# Patient Record
Sex: MALE | Race: BLACK OR AFRICAN AMERICAN | Employment: FULL TIME | URBAN - METROPOLITAN AREA
[De-identification: names, ages, dates, MRNs, and addresses within clinical notes are randomized per-mention and may not be internally consistent; named-entity substitution may affect disease eponyms.]

---

## 2017-01-14 ENCOUNTER — APPOINTMENT (OUTPATIENT)
Dept: CT IMAGING | Age: 47
DRG: 438 | End: 2017-01-14
Attending: NURSE PRACTITIONER
Payer: COMMERCIAL

## 2017-01-14 ENCOUNTER — HOSPITAL ENCOUNTER (INPATIENT)
Age: 47
LOS: 4 days | Discharge: HOME | DRG: 438 | End: 2017-01-19
Attending: EMERGENCY MEDICINE | Admitting: HOSPITALIST
Payer: COMMERCIAL

## 2017-01-14 DIAGNOSIS — R73.9 HYPERGLYCEMIA: ICD-10-CM

## 2017-01-14 DIAGNOSIS — R74.8 ELEVATED LIVER ENZYMES: ICD-10-CM

## 2017-01-14 DIAGNOSIS — K85.90 ACUTE PANCREATITIS, UNSPECIFIED COMPLICATION STATUS, UNSPECIFIED PANCREATITIS TYPE: Primary | ICD-10-CM

## 2017-01-14 DIAGNOSIS — R10.84 ABDOMINAL PAIN, GENERALIZED: ICD-10-CM

## 2017-01-14 DIAGNOSIS — R00.0 TACHYCARDIA: ICD-10-CM

## 2017-01-14 DIAGNOSIS — I95.9 HYPOTENSION, UNSPECIFIED HYPOTENSION TYPE: ICD-10-CM

## 2017-01-14 LAB
ALBUMIN SERPL BCP-MCNC: 4.4 G/DL (ref 3.4–5)
ALBUMIN/GLOB SERPL: 2.2 {RATIO} (ref 0.8–1.7)
ALP SERPL-CCNC: 40 U/L (ref 45–117)
ALT SERPL-CCNC: 163 U/L (ref 16–61)
ANION GAP BLD CALC-SCNC: 15 MMOL/L (ref 3–18)
APPEARANCE UR: CLEAR
AST SERPL W P-5'-P-CCNC: 287 U/L (ref 15–37)
BASOPHILS # BLD AUTO: 0 K/UL (ref 0–0.06)
BASOPHILS # BLD: 0 % (ref 0–2)
BILIRUB DIRECT SERPL-MCNC: 0.4 MG/DL (ref 0–0.2)
BILIRUB SERPL-MCNC: 0.8 MG/DL (ref 0.2–1)
BILIRUB UR QL: NEGATIVE
BUN SERPL-MCNC: 14 MG/DL (ref 7–18)
BUN/CREAT SERPL: 10 (ref 12–20)
CALCIUM SERPL-MCNC: 9 MG/DL (ref 8.5–10.1)
CHLORIDE SERPL-SCNC: 102 MMOL/L (ref 100–108)
CO2 SERPL-SCNC: 22 MMOL/L (ref 21–32)
COLOR UR: YELLOW
CREAT SERPL-MCNC: 1.47 MG/DL (ref 0.6–1.3)
DIFFERENTIAL METHOD BLD: ABNORMAL
EOSINOPHIL # BLD: 0 K/UL (ref 0–0.4)
EOSINOPHIL NFR BLD: 0 % (ref 0–5)
ERYTHROCYTE [DISTWIDTH] IN BLOOD BY AUTOMATED COUNT: 13.8 % (ref 11.6–14.5)
GLOBULIN SER CALC-MCNC: 2 G/DL (ref 2–4)
GLUCOSE SERPL-MCNC: 214 MG/DL (ref 74–99)
GLUCOSE UR STRIP.AUTO-MCNC: >1000 MG/DL
HCT VFR BLD AUTO: 35.2 % (ref 36–48)
HGB BLD-MCNC: 11.4 G/DL (ref 13–16)
HGB UR QL STRIP: NEGATIVE
KETONES UR QL STRIP.AUTO: NEGATIVE MG/DL
LEUKOCYTE ESTERASE UR QL STRIP.AUTO: NEGATIVE
LIPASE SERPL-CCNC: 1474 U/L (ref 73–393)
LYMPHOCYTES # BLD AUTO: 17 % (ref 21–52)
LYMPHOCYTES # BLD: 0.4 K/UL (ref 0.9–3.6)
MCH RBC QN AUTO: 29.3 PG (ref 24–34)
MCHC RBC AUTO-ENTMCNC: 32.4 G/DL (ref 31–37)
MCV RBC AUTO: 90.5 FL (ref 74–97)
MONOCYTES # BLD: 0 K/UL (ref 0.05–1.2)
MONOCYTES NFR BLD AUTO: 0 % (ref 3–10)
NEUTS SEG # BLD: 1.9 K/UL (ref 1.8–8)
NEUTS SEG NFR BLD AUTO: 83 % (ref 40–73)
NITRITE UR QL STRIP.AUTO: NEGATIVE
PH UR STRIP: 5 [PH] (ref 5–8)
PLATELET # BLD AUTO: 170 K/UL (ref 135–420)
PMV BLD AUTO: 10.1 FL (ref 9.2–11.8)
POTASSIUM SERPL-SCNC: 3.6 MMOL/L (ref 3.5–5.5)
PROT SERPL-MCNC: 6.4 G/DL (ref 6.4–8.2)
PROT UR STRIP-MCNC: NEGATIVE MG/DL
RBC # BLD AUTO: 3.89 M/UL (ref 4.7–5.5)
SODIUM SERPL-SCNC: 139 MMOL/L (ref 136–145)
SP GR UR REFRACTOMETRY: >1.03 (ref 1–1.03)
UROBILINOGEN UR QL STRIP.AUTO: 0.2 EU/DL (ref 0.2–1)
WBC # BLD AUTO: 2.2 K/UL (ref 4.6–13.2)

## 2017-01-14 PROCEDURE — 96372 THER/PROPH/DIAG INJ SC/IM: CPT

## 2017-01-14 PROCEDURE — 96361 HYDRATE IV INFUSION ADD-ON: CPT

## 2017-01-14 PROCEDURE — 74011636320 HC RX REV CODE- 636/320: Performed by: EMERGENCY MEDICINE

## 2017-01-14 PROCEDURE — 96374 THER/PROPH/DIAG INJ IV PUSH: CPT

## 2017-01-14 PROCEDURE — 83690 ASSAY OF LIPASE: CPT | Performed by: NURSE PRACTITIONER

## 2017-01-14 PROCEDURE — 99285 EMERGENCY DEPT VISIT HI MDM: CPT

## 2017-01-14 PROCEDURE — 85025 COMPLETE CBC W/AUTO DIFF WBC: CPT | Performed by: NURSE PRACTITIONER

## 2017-01-14 PROCEDURE — 74011250636 HC RX REV CODE- 250/636: Performed by: NURSE PRACTITIONER

## 2017-01-14 PROCEDURE — 80076 HEPATIC FUNCTION PANEL: CPT | Performed by: NURSE PRACTITIONER

## 2017-01-14 PROCEDURE — 74177 CT ABD & PELVIS W/CONTRAST: CPT

## 2017-01-14 PROCEDURE — 81003 URINALYSIS AUTO W/O SCOPE: CPT | Performed by: NURSE PRACTITIONER

## 2017-01-14 PROCEDURE — 80048 BASIC METABOLIC PNL TOTAL CA: CPT | Performed by: NURSE PRACTITIONER

## 2017-01-14 RX ORDER — ONDANSETRON 2 MG/ML
4 INJECTION INTRAMUSCULAR; INTRAVENOUS
Status: DISCONTINUED | OUTPATIENT
Start: 2017-01-14 | End: 2017-01-19 | Stop reason: HOSPADM

## 2017-01-14 RX ORDER — SODIUM CHLORIDE 0.9 % (FLUSH) 0.9 %
5-10 SYRINGE (ML) INJECTION EVERY 8 HOURS
Status: DISCONTINUED | OUTPATIENT
Start: 2017-01-14 | End: 2017-01-19 | Stop reason: HOSPADM

## 2017-01-14 RX ORDER — HYDROMORPHONE HYDROCHLORIDE 2 MG/ML
2 INJECTION, SOLUTION INTRAMUSCULAR; INTRAVENOUS; SUBCUTANEOUS
Status: COMPLETED | OUTPATIENT
Start: 2017-01-14 | End: 2017-01-14

## 2017-01-14 RX ORDER — ENOXAPARIN SODIUM 100 MG/ML
40 INJECTION SUBCUTANEOUS EVERY 24 HOURS
Status: DISCONTINUED | OUTPATIENT
Start: 2017-01-14 | End: 2017-01-19 | Stop reason: HOSPADM

## 2017-01-14 RX ORDER — SODIUM CHLORIDE 0.9 % (FLUSH) 0.9 %
5-10 SYRINGE (ML) INJECTION AS NEEDED
Status: DISCONTINUED | OUTPATIENT
Start: 2017-01-14 | End: 2017-01-19 | Stop reason: HOSPADM

## 2017-01-14 RX ADMIN — IOPAMIDOL 100 ML: 612 INJECTION, SOLUTION INTRAVENOUS at 22:07

## 2017-01-14 RX ADMIN — Medication 10 ML: at 23:58

## 2017-01-14 RX ADMIN — SODIUM CHLORIDE 1000 ML: 900 INJECTION, SOLUTION INTRAVENOUS at 23:30

## 2017-01-14 RX ADMIN — HYDROMORPHONE HYDROCHLORIDE 2 MG: 2 INJECTION INTRAMUSCULAR; INTRAVENOUS; SUBCUTANEOUS at 22:53

## 2017-01-14 RX ADMIN — SODIUM CHLORIDE 1000 ML: 900 INJECTION, SOLUTION INTRAVENOUS at 22:40

## 2017-01-14 NOTE — IP AVS SNAPSHOT
Current Discharge Medication List  
  
Take these medications at their scheduled times Dose & Instructions Dispensing Information Comments Morning Noon Evening Bedtime  
 actoplus met  mg per tablet Generic drug:  pioglitazone-metFORMIN Your next dose is:  Tomorrow Dose:  1 Tab Take 1 tablet by mouth two (2) times daily (with meals). Refills:  0  
     
  
   
   
   
  
 clomiPRAMINE 25 mg capsule Commonly known as:  ANAFRANIL Your next dose is: Today Dose:  25 mg Take 25 mg by mouth nightly. Refills:  0  
     
   
   
   
  
  
 hydroCHLOROthiazide 12.5 mg tablet Commonly known as:  HYDRODIURIL Your next dose is:  Tomorrow Dose:  12.5 mg Take 12.5 mg by mouth daily. Refills:  0  
     
  
   
   
   
  
 lisinopril 20 mg tablet Commonly known as:  Robertha Roup Your next dose is:  Tomorrow Take  by mouth daily. Refills:  0 Take these medications as directed Dose & Instructions Dispensing Information Comments Morning Noon Evening Bedtime LORazepam 0.5 mg tablet Commonly known as:  ATIVAN Take  by mouth. Refills:  0  
     
   
   
   
  
 VITAMIN D3 1,000 unit Cap Generic drug:  cholecalciferol Take  by mouth. Refills:  0

## 2017-01-14 NOTE — IP AVS SNAPSHOT
Summary of Care Report The Summary of Care report has been created to help improve care coordination. Users with access to [employer] or [address] (Web-based application) may access additional patient information including the Discharge Summary. If you are not currently a [address] user and need more information, please call the number listed below in the [place] [address] section and ask to be connected with Medical Records. Facility Information Name Address Phone [facility] [address] [facility] [address] [id number] [phone] Patient Information Patient Name Sex  Maddie Kruger ([id number]) Male 1970 Discharge Information Admitting Provider Service Area Unit Neal Bonilla MD /  Cynthia Storm / [phone] Discharge Provider Discharge Date/Time Discharge Disposition Destination (none) 2017 (Pending) AHR (none) Patient Language Language ENGLISH [13] Problem List as of 2017  Date Reviewed: 12/15/2014 Codes Priority Class Noted - Resolved Erectile dysfunction ICD-10-CM: N52.9 ICD-9-CM: 607.84   10/21/2014 - Present Hypertension ICD-10-CM: I10 
ICD-9-CM: 401.9   10/21/2014 - Present Anxiety disorder ICD-10-CM: F41.9 ICD-9-CM: 300.00   10/21/2014 - Present Diabetes ([address] ICD-10-CM: E11.9 ICD-9-CM: 250.00   12/15/2014 - Present Hypercholesteremia ICD-10-CM: E78.00 ICD-9-CM: 272.0   12/15/2014 - Present Acute pancreatitis ICD-10-CM: K85.90 ICD-9-CM: 172.5   1/15/2017 - Present You are allergic to the following No active allergies Current Discharge Medication List  
  
CONTINUE these medications which have NOT CHANGED Dose & Instructions Dispensing Information Comments  
 actoplus met  mg per tablet Generic drug:  pioglitazone-metFORMIN Dose:  1 Tab Take 1 tablet by mouth two (2) times daily (with meals). Refills:  0  
   
 clomiPRAMINE 25 mg capsule Commonly known as:  ANAFRANIL Dose:  25 mg Take 25 mg by mouth nightly. Refills:  0  
   
 hydroCHLOROthiazide 12.5 mg tablet Commonly known as:  HYDRODIURIL Dose:  12.5 mg Take 12.5 mg by mouth daily. Refills:  0  
   
 lisinopril 20 mg tablet Commonly known as:  Shena Wellton Take  by mouth daily. Refills:  0 LORazepam 0.5 mg tablet Commonly known as:  ATIVAN Take  by mouth. Refills:  0  
   
 VITAMIN D3 1,000 unit Cap Generic drug:  cholecalciferol Take  by mouth. Refills:  0 STOP taking these medications Comments  
 simvastatin 10 mg tablet Commonly known as:  ZOCOR Current Immunizations Name Date Influenza Vaccine 10/12/2016 Follow-up Information Follow up With Details Comments Contact Info Korey Osuna MD Go on 2/1/2017  at [address] [place] [id number] [phone] Discharge Instructions Abdominal Pain: Care Instructions Your Care Instructions Abdominal pain has many possible causes. Some aren't serious and get better on their own in a few days. Others need more testing and treatment. If your pain continues or gets worse, you need to be rechecked and may need more tests to find out what is wrong. You may need surgery to correct the problem. Don't ignore new symptoms, such as fever, nausea and vomiting, urination problems, pain that gets worse, and dizziness. These may be signs of a more serious problem. Your doctor may have recommended a follow-up visit in the next 8 to 12 hours. If you are not getting better, you may need more tests or treatment. The doctor has checked you carefully, but problems can develop later. If you notice any problems or new symptoms, get medical treatment right away. Follow-up care is a key part of your treatment and safety. Be sure to make and go to all appointments, and call your doctor if you are having problems. It's also a good idea to know your test results and keep a list of the medicines you take. How can you care for yourself at home? · Rest until you feel better. · To prevent dehydration, drink plenty of fluids, enough so that your urine is light yellow or clear like water. Choose water and other caffeine-free clear liquids until you feel better. If you have kidney, heart, or liver disease and have to limit fluids, talk with your doctor before you increase the amount of fluids you drink. · If your stomach is upset, eat mild foods, such as rice, dry toast or crackers, bananas, and applesauce. Try eating several small meals instead of two or three large ones. · Wait until 48 hours after all symptoms have gone away before you have spicy foods, alcohol, and drinks that contain caffeine. · Do not eat foods that are high in fat. · Avoid anti-inflammatory medicines such as aspirin, ibuprofen (Advil, Motrin), and naproxen (Aleve). These can cause stomach upset. Talk to your doctor if you take daily aspirin for another health problem. When should you call for help? Call 911 anytime you think you may need emergency care. For example, call if: 
· You passed out (lost consciousness). · You pass maroon or very bloody stools. · You vomit blood or what looks like coffee grounds. · You have new, severe belly pain. Call your doctor now or seek immediate medical care if: 
· Your pain gets worse, especially if it becomes focused in one area of your belly. · You have a new or higher fever. · Your stools are black and look like tar, or they have streaks of blood. · You have unexpected vaginal bleeding. · You have symptoms of a urinary tract infection. These may include: 
¨ Pain when you urinate. ¨ Urinating more often than usual. 
¨ Blood in your urine. · You are dizzy or lightheaded, or you feel like you may faint. Watch closely for changes in your health, and be sure to contact your doctor if: 
· You are not getting better after 1 day (24 hours). Where can you learn more? Go to http://lynnette-george.info/. Enter U319 in the search box to learn more about \"Abdominal Pain: Care Instructions. \" Current as of: May 27, 2016 Content Version: 11.1 © 1448-3582 [employer]. Care instructions adapted under license by [employer] (which disclaims liability or warranty for this information). If you have questions about a medical condition or this instruction, always ask your healthcare professional. [place] [address] any warranty or liability for your use of this information. DISCHARGE SUMMARY from Nurse The following personal items are in your possession at time of discharge: 
 
Dental Appliances: None Visual Aid: None Home Medications: None Jewelry: Ring, With patient Clothing: Footwear, Pants, Shirt, Socks, Undergarments, With patient Other Valuables: Cell Phone, With patient Personal Items Sent to Safe: None PATIENT INSTRUCTIONS: 
 
 
F-face looks uneven A-arms unable to move or move unevenly S-speech slurred or non-existent T-time-call 911 as soon as signs and symptoms begin-DO NOT go Back to bed or wait to see if you get better-TIME IS BRAIN. Warning Signs of HEART ATTACK Call 911 if you have these symptoms: 
? Chest discomfort.  Most heart attacks involve discomfort in the center of the chest that lasts more than a few minutes, or that goes away and comes back. It can feel like uncomfortable pressure, squeezing, fullness, or pain. ? Discomfort in other areas of the upper body. Symptoms can include pain or discomfort in one or both arms, the back, neck, jaw, or stomach. ? Shortness of breath with or without chest discomfort. ? Other signs may include breaking out in a cold sweat, nausea, or lightheadedness. Don't wait more than five minutes to call [address]! Fast action can save your life. Calling 911 is almost always the fastest way to get lifesaving treatment. Emergency Medical Services staff can begin treatment when they arrive  up to an hour sooner than if someone gets to the hospital by car. The discharge information has been reviewed with the patient. The patient verbalized understanding. Discharge medications reviewed with the patient and appropriate educational materials and side effects teaching were provided. Patient armband removed and shredded MyChart Activation Thank you for requesting access to [employer]. Please follow the instructions below to securely access and download your online medical record. [employer] allows you to send messages to your doctor, view your test results, renew your prescriptions, schedule appointments, and more. How Do I Sign Up? 1. In your internet browser, go to [email or web address] 
2. Click on the First Time User? Click Here link in the Sign In box. You will be redirect to the New Member Sign Up page. 3. Enter your [employer] Access Code exactly as it appears below. You will not need to use this code after youve completed the sign-up process. If you do not sign up before the expiration date, you must request a new code. [employer] Access Code: [email or web address] Expires: 2017 10:14 PM (This is the date your [employer] access code will ) 4. Enter the last four digits of your Social Security Number (xxxx) and Date of Birth (mm/dd/yyyy) as indicated and click Submit.  You will be taken to the next sign-up page. 5. Create a [employer] ID. This will be your [employer] login ID and cannot be changed, so think of one that is secure and easy to remember. 6. Create a [employer] password. You can change your password at any time. 7. Enter your Password Reset Question and Answer. This can be used at a later time if you forget your password. 8. Enter your e-mail address. You will receive e-mail notification when new information is available in [address]. 9. Click Sign Up. You can now view and download portions of your medical record. 10. Click the Download Summary menu link to download a portable copy of your medical information. Additional Information If you have questions, please visit the Frequently Asked Questions section of the [employer] website at [email or web address] Remember, [employer] is NOT to be used for urgent needs. For medical emergencies, dial 911. Chart Review Routing History No Routing History on File

## 2017-01-14 NOTE — IP AVS SNAPSHOT
[address] Patient: Yun Brandt MRN: [id number] NPC:[id number] You are allergic to the following No active allergies Recent Documentation Height Weight BMI Smoking Status 1.778 m 97.5 kg 30.85 kg/m2 Never Smoker Unresulted Labs Order Current Status CULTURE, BLOOD Preliminary result CULTURE, BLOOD Preliminary result Emergency Contacts Name Discharge Info Relation Home Work Mobile Boone Brandt  Spouse [3] [phone] About your hospitalization You were admitted on:  January 15, 2017 You last received care in the:  [address] [place] [address],[address] You were discharged on:  January 19, 2017 Unit phone number:  [phone] Why you were hospitalized Your primary diagnosis was:  Not on File Your diagnoses also included:  Acute Pancreatitis Providers Seen During Your Hospitalizations Provider Role Specialty Primary office phone Ella Horne DO Attending Provider Emergency Medicine [phone] Charly Lou MD Attending Provider [facility] [phone] Parish Morley MD Attending Provider [facility] [phone] Luisa Disla DO Attending Provider Internal Medicine [phone] Your Primary Care Physician (PCP) Primary Care Physician Office Phone Office Fax Dariusia Viri [phone] Follow-up Information Follow up With Details Comments Contact Info Kassie Guidry MD Go on 2/1/2017  at [address] [place] [address] [phone] Current Discharge Medication List  
  
CONTINUE these medications which have NOT CHANGED Dose & Instructions Dispensing Information Comments Morning Noon Evening Bedtime  
 actoplus met  mg per tablet Generic drug:  pioglitazone-metFORMIN Your next dose is:  Tomorrow Dose:  1 Tab Take 1 tablet by mouth two (2) times daily (with meals). Refills:  0  
     
  
   
   
   
  
 clomiPRAMINE 25 mg capsule Commonly known as:  ANAFRANIL Your next dose is: Today Dose:  25 mg Take 25 mg by mouth nightly. Refills:  0  
     
   
   
   
  
  
 hydroCHLOROthiazide 12.5 mg tablet Commonly known as:  HYDRODIURIL Your next dose is:  Tomorrow Dose:  12.5 mg Take 12.5 mg by mouth daily. Refills:  0  
     
  
   
   
   
  
 lisinopril 20 mg tablet Commonly known as:  Disha Amber Your next dose is:  Tomorrow Take  by mouth daily. Refills:  0 LORazepam 0.5 mg tablet Commonly known as:  ATIVAN Take  by mouth. Refills:  0  
     
   
   
   
  
 VITAMIN D3 1,000 unit Cap Generic drug:  cholecalciferol Take  by mouth. Refills:  0 STOP taking these medications   
 simvastatin 10 mg tablet Commonly known as:  ZOCOR Discharge Instructions Abdominal Pain: Care Instructions Your Care Instructions Abdominal pain has many possible causes. Some aren't serious and get better on their own in a few days. Others need more testing and treatment. If your pain continues or gets worse, you need to be rechecked and may need more tests to find out what is wrong. You may need surgery to correct the problem. Don't ignore new symptoms, such as fever, nausea and vomiting, urination problems, pain that gets worse, and dizziness. These may be signs of a more serious problem. Your doctor may have recommended a follow-up visit in the next 8 to 12 hours. If you are not getting better, you may need more tests or treatment. The doctor has checked you carefully, but problems can develop later. If you notice any problems or new symptoms, get medical treatment right away. Follow-up care is a key part of your treatment and safety.  Be sure to make and go to all appointments, and call your doctor if you are having problems. It's also a good idea to know your test results and keep a list of the medicines you take. How can you care for yourself at home? · Rest until you feel better. · To prevent dehydration, drink plenty of fluids, enough so that your urine is light yellow or clear like water. Choose water and other caffeine-free clear liquids until you feel better. If you have kidney, heart, or liver disease and have to limit fluids, talk with your doctor before you increase the amount of fluids you drink. · If your stomach is upset, eat mild foods, such as rice, dry toast or crackers, bananas, and applesauce. Try eating several small meals instead of two or three large ones. · Wait until 48 hours after all symptoms have gone away before you have spicy foods, alcohol, and drinks that contain caffeine. · Do not eat foods that are high in fat. · Avoid anti-inflammatory medicines such as aspirin, ibuprofen (Advil, Motrin), and naproxen (Aleve). These can cause stomach upset. Talk to your doctor if you take daily aspirin for another health problem. When should you call for help? Call 911 anytime you think you may need emergency care. For example, call if: 
· You passed out (lost consciousness). · You pass maroon or very bloody stools. · You vomit blood or what looks like coffee grounds. · You have new, severe belly pain. Call your doctor now or seek immediate medical care if: 
· Your pain gets worse, especially if it becomes focused in one area of your belly. · You have a new or higher fever. · Your stools are black and look like tar, or they have streaks of blood. · You have unexpected vaginal bleeding. · You have symptoms of a urinary tract infection. These may include: 
¨ Pain when you urinate. ¨ Urinating more often than usual. 
¨ Blood in your urine. · You are dizzy or lightheaded, or you feel like you may faint. Watch closely for changes in your health, and be sure to contact your doctor if: 
· You are not getting better after 1 day (24 hours). Where can you learn more? Go to http://lynnette-george.info/. Enter W929 in the search box to learn more about \"Abdominal Pain: Care Instructions. \" Current as of: May 27, 2016 Content Version: 11.1 © 2006-2016 [employer]. Care instructions adapted under license by [employer] (which disclaims liability or warranty for this information). If you have questions about a medical condition or this instruction, always ask your healthcare professional. [place] [address] any warranty or liability for your use of this information. DISCHARGE SUMMARY from Nurse The following personal items are in your possession at time of discharge: 
 
Dental Appliances: None Visual Aid: None Home Medications: None Jewelry: Ring, With patient Clothing: Footwear, Pants, Shirt, Socks, Undergarments, With patient Other Valuables: Cell Phone, With patient Personal Items Sent to Safe: None PATIENT INSTRUCTIONS: 
 
 
F-face looks uneven A-arms unable to move or move unevenly S-speech slurred or non-existent T-time-call 911 as soon as signs and symptoms begin-DO NOT go Back to bed or wait to see if you get better-TIME IS BRAIN. Warning Signs of HEART ATTACK Call 911 if you have these symptoms: 
? Chest discomfort.  Most heart attacks involve discomfort in the center of the chest that lasts more than a few minutes, or that goes away and comes back. It can feel like uncomfortable pressure, squeezing, fullness, or pain. ? Discomfort in other areas of the upper body. Symptoms can include pain or discomfort in one or both arms, the back, neck, jaw, or stomach. ? Shortness of breath with or without chest discomfort. ? Other signs may include breaking out in a cold sweat, nausea, or lightheadedness. Don't wait more than five minutes to call [address]! Fast action can save your life. Calling 911 is almost always the fastest way to get lifesaving treatment. Emergency Medical Services staff can begin treatment when they arrive  up to an hour sooner than if someone gets to the hospital by car. The discharge information has been reviewed with the patient. The patient verbalized understanding. Discharge medications reviewed with the patient and appropriate educational materials and side effects teaching were provided. Patient armband removed and shredded MyChart Activation Thank you for requesting access to [employer]Up. Please follow the instructions below to securely access and download your online medical record. T[employer]Up allows you to send messages to your doctor, view your test results, renew your prescriptions, schedule appointments, and more. How Do I Sign Up? 1. In your internet browser, go to [email or web address] 
2. Click on the First Time User? Click Here link in the Sign In box. You will be redirect to the New Member Sign Up page. 3. Enter your TrepUp Access Code exactly as it appears below. You will not need to use this code after youve completed the sign-up process. If you do not sign up before the expiration date, you must request a new code. TrepUp Access Code: [email or web address] Expires: 2017 10:14 PM (This is the date your TrepUp access code will ) 4. Enter the last four digits of your Social Security Number (xxxx) and Date of Birth (mm/dd/yyyy) as indicated and click Submit.  You will be taken to the next sign-up page. 5. Create a [facility] ID. This will be your [facility] REIT login ID and cannot be changed, so think of one that is secure and easy to remember. 6. Create a [facility] REIT password. You can change your password at any time. 7. Enter your Password Reset Question and Answer. This can be used at a later time if you forget your password. 8. Enter your e-mail address. You will receive e-mail notification when new information is available in [address] 9. Click Sign Up. You can now view and download portions of your medical record. 10. Click the Download Summary menu link to download a portable copy of your medical information. Additional Information If you have questions, please visit the Frequently Asked Questions section of the Internet REIT website at [email or web address] Remember, Internet REIT is NOT to be used for urgent needs. For medical emergencies, dial 911. Discharge Instructions Attachments/References PNEUMONIA (ENGLISH) Discharge Orders None Introducing [facility]! Tao Villeda introduces Internet REIT patient portal. Now you can access parts of your medical record, email your doctor's office, and request medication refills online. 1. In your internet browser, go to [email or web address] 2. Click on the First Time User? Click Here link in the Sign In box. You will see the New Member Sign Up page. 3. Enter your [facility] REIT Access Code exactly as it appears below. You will not need to use this code after youve completed the sign-up process. If you do not sign up before the expiration date, you must request a new code. · Internet REIT Access Code: [email or web address] Expires: 4/14/2017 10:14 PM 
 
4. Enter the last four digits of your Social Security Number (xxxx) and Date of Birth (mm/dd/yyyy) as indicated and click Submit. You will be taken to the next sign-up page. 5. Create a [facility] ID. This will be your [facility] login ID and cannot be changed, so think of one that is secure and easy to remember. 6. Create a [facility] password. You can change your password at any time. 7. Enter your Password Reset Question and Answer. This can be used at a later time if you forget your password. 8. Enter your e-mail address. You will receive e-mail notification when new information is available in [address] 9. Click Sign Up. You can now view and download portions of your medical record. 10. Click the Download Summary menu link to download a portable copy of your medical information. If you have questions, please visit the Frequently Asked Questions section of the [facility] website. Remember, [facility] is NOT to be used for urgent needs. For medical emergencies, dial 911. Now available from your iPhone and Android! General Information Please provide this summary of care documentation to your next provider. Patient Signature:  ____________________________________________________________ Date:  ____________________________________________________________  
  
[facility] Provider Signature:  ____________________________________________________________ Date:  ____________________________________________________________ More Information Pneumonia: Care Instructions Your Care Instructions Pneumonia is an infection of the lungs. Most cases are caused by infections from bacteria or viruses. Pneumonia may be mild or very severe. If it is caused by bacteria, you will be treated with antibiotics. It may take a few weeks to a few months to recover fully from pneumonia, depending on how sick you were and whether your overall health is good. Follow-up care is a key part of your treatment and safety.  Be sure to make and go to all appointments, and call your doctor if you are having problems. Its also a good idea to know your test results and keep a list of the medicines you take. How can you care for yourself at home? · Take your antibiotics exactly as directed. Do not stop taking the medicine just because you are feeling better. You need to take the full course of antibiotics. · Take your medicines exactly as prescribed. Call your doctor if you think you are having a problem with your medicine. · Get plenty of rest and sleep. You may feel weak and tired for a while, but your energy level will improve with time. · To prevent dehydration, drink plenty of fluids, enough so that your urine is light yellow or clear like water. Choose water and other caffeine-free clear liquids until you feel better. If you have kidney, heart, or liver disease and have to limit fluids, talk with your doctor before you increase the amount of fluids you drink. · Take care of your cough so you can rest. A cough that brings up mucus from your lungs is common with pneumonia. It is one way your body gets rid of the infection. But if coughing keeps you from resting or causes severe fatigue and chest-wall pain, talk to your doctor. He or she may suggest that you take a medicine to reduce the cough. · Use a vaporizer or humidifier to add moisture to your bedroom. Follow the directions for cleaning the machine. · Do not smoke or allow others to smoke around you. Smoke will make your cough last longer. If you need help quitting, talk to your doctor about stop-smoking programs and medicines. These can increase your chances of quitting for good. · Take an over-the-counter pain medicine, such as acetaminophen (Tylenol), ibuprofen (Advil, Motrin), or naproxen (Aleve). Read and follow all instructions on the label. · Do not take two or more pain medicines at the same time unless the doctor told you to. Many pain medicines have acetaminophen, which is Tylenol. Too much acetaminophen (Tylenol) can be harmful. · If you were given a spirometer to measure how well your lungs are working, use it as instructed. This can help your doctor tell how your recovery is going. · To prevent pneumonia in the future, talk to your doctor about getting a flu vaccine (once a year) and a pneumococcal vaccine (one time only for most people). When should you call for help? Call 911 anytime you think you may need emergency care. For example, call if: 
· You have severe trouble breathing. Call your doctor now or seek immediate medical care if: 
· You cough up dark brown or bloody mucus (sputum). · You have new or worse trouble breathing. · You are dizzy or lightheaded, or you feel like you may faint. Watch closely for changes in your health, and be sure to contact your doctor if: 
· You have a new or higher fever. · You are coughing more deeply or more often. · You are not getting better after 2 days (48 hours). · You do not get better as expected. Where can you learn more? Go to http://lynnette-george.info/. Enter [id number] in the search box to learn more about \"Pneumonia: Care Instructions. \" Current as of: May 23, 2016 Content Version: 11.1 © 3760-9602 [employer]. Care instructions adapted under license by [employer] (which disclaims liability or warranty for this information). If you have questions about a medical condition or this instruction, always ask your healthcare professional. [place] [address] any warranty or liability for your use of this information.

## 2017-01-15 ENCOUNTER — APPOINTMENT (OUTPATIENT)
Dept: GENERAL RADIOLOGY | Age: 47
DRG: 438 | End: 2017-01-15
Attending: HOSPITALIST
Payer: COMMERCIAL

## 2017-01-15 PROBLEM — K85.90 ACUTE PANCREATITIS: Status: ACTIVE | Noted: 2017-01-15

## 2017-01-15 LAB
ANION GAP BLD CALC-SCNC: 9 MMOL/L (ref 3–18)
BASOPHILS # BLD AUTO: 0 K/UL (ref 0–0.06)
BASOPHILS # BLD: 0 % (ref 0–2)
BUN SERPL-MCNC: 14 MG/DL (ref 7–18)
BUN/CREAT SERPL: 11 (ref 12–20)
CALCIUM SERPL-MCNC: 8.4 MG/DL (ref 8.5–10.1)
CHLORIDE SERPL-SCNC: 108 MMOL/L (ref 100–108)
CO2 SERPL-SCNC: 25 MMOL/L (ref 21–32)
CREAT SERPL-MCNC: 1.3 MG/DL (ref 0.6–1.3)
DIFFERENTIAL METHOD BLD: ABNORMAL
EOSINOPHIL # BLD: 0 K/UL (ref 0–0.4)
EOSINOPHIL NFR BLD: 0 % (ref 0–5)
ERYTHROCYTE [DISTWIDTH] IN BLOOD BY AUTOMATED COUNT: 14 % (ref 11.6–14.5)
FLUAV AG NPH QL IA: NEGATIVE
FLUBV AG NOSE QL IA: NEGATIVE
GLUCOSE SERPL-MCNC: 179 MG/DL (ref 74–99)
HCT VFR BLD AUTO: 31 % (ref 36–48)
HGB BLD-MCNC: 10.1 G/DL (ref 13–16)
LYMPHOCYTES # BLD AUTO: 4 % (ref 21–52)
LYMPHOCYTES # BLD: 0.4 K/UL (ref 0.9–3.6)
MCH RBC QN AUTO: 29.4 PG (ref 24–34)
MCHC RBC AUTO-ENTMCNC: 32.6 G/DL (ref 31–37)
MCV RBC AUTO: 90.1 FL (ref 74–97)
MONOCYTES # BLD: 0.2 K/UL (ref 0.05–1.2)
MONOCYTES NFR BLD AUTO: 2 % (ref 3–10)
NEUTS SEG # BLD: 9.6 K/UL (ref 1.8–8)
NEUTS SEG NFR BLD AUTO: 94 % (ref 40–73)
PLATELET # BLD AUTO: 177 K/UL (ref 135–420)
PMV BLD AUTO: 10.3 FL (ref 9.2–11.8)
POTASSIUM SERPL-SCNC: 4.2 MMOL/L (ref 3.5–5.5)
RBC # BLD AUTO: 3.44 M/UL (ref 4.7–5.5)
SODIUM SERPL-SCNC: 142 MMOL/L (ref 136–145)
WBC # BLD AUTO: 10.2 K/UL (ref 4.6–13.2)

## 2017-01-15 PROCEDURE — 74011250636 HC RX REV CODE- 250/636: Performed by: HOSPITALIST

## 2017-01-15 PROCEDURE — 74011250636 HC RX REV CODE- 250/636: Performed by: NURSE PRACTITIONER

## 2017-01-15 PROCEDURE — 85025 COMPLETE CBC W/AUTO DIFF WBC: CPT | Performed by: HOSPITALIST

## 2017-01-15 PROCEDURE — 65270000029 HC RM PRIVATE

## 2017-01-15 PROCEDURE — 74011250637 HC RX REV CODE- 250/637: Performed by: EMERGENCY MEDICINE

## 2017-01-15 PROCEDURE — 80048 BASIC METABOLIC PNL TOTAL CA: CPT | Performed by: HOSPITALIST

## 2017-01-15 PROCEDURE — 74011000250 HC RX REV CODE- 250

## 2017-01-15 PROCEDURE — 94660 CPAP INITIATION&MGMT: CPT

## 2017-01-15 PROCEDURE — 71010 XR CHEST PORT: CPT

## 2017-01-15 PROCEDURE — 87804 INFLUENZA ASSAY W/OPTIC: CPT | Performed by: EMERGENCY MEDICINE

## 2017-01-15 RX ORDER — IPRATROPIUM BROMIDE AND ALBUTEROL SULFATE 2.5; .5 MG/3ML; MG/3ML
SOLUTION RESPIRATORY (INHALATION)
Status: COMPLETED
Start: 2017-01-15 | End: 2017-01-15

## 2017-01-15 RX ORDER — SODIUM CHLORIDE 9 MG/ML
100 INJECTION, SOLUTION INTRAVENOUS CONTINUOUS
Status: DISCONTINUED | OUTPATIENT
Start: 2017-01-15 | End: 2017-01-17

## 2017-01-15 RX ORDER — KETOROLAC TROMETHAMINE 30 MG/ML
30 INJECTION, SOLUTION INTRAMUSCULAR; INTRAVENOUS
Status: COMPLETED | OUTPATIENT
Start: 2017-01-15 | End: 2017-01-15

## 2017-01-15 RX ORDER — ACETAMINOPHEN 500 MG
1000 TABLET ORAL
Status: DISCONTINUED | OUTPATIENT
Start: 2017-01-15 | End: 2017-01-15

## 2017-01-15 RX ORDER — IPRATROPIUM BROMIDE AND ALBUTEROL SULFATE 2.5; .5 MG/3ML; MG/3ML
3 SOLUTION RESPIRATORY (INHALATION)
Status: COMPLETED | OUTPATIENT
Start: 2017-01-15 | End: 2017-01-15

## 2017-01-15 RX ORDER — IBUPROFEN 600 MG/1
600 TABLET ORAL
Status: COMPLETED | OUTPATIENT
Start: 2017-01-15 | End: 2017-01-15

## 2017-01-15 RX ORDER — MORPHINE SULFATE 4 MG/ML
4 INJECTION, SOLUTION INTRAMUSCULAR; INTRAVENOUS
Status: DISCONTINUED | OUTPATIENT
Start: 2017-01-15 | End: 2017-01-19 | Stop reason: HOSPADM

## 2017-01-15 RX ADMIN — SODIUM CHLORIDE 200 ML/HR: 900 INJECTION, SOLUTION INTRAVENOUS at 22:23

## 2017-01-15 RX ADMIN — Medication 10 ML: at 06:34

## 2017-01-15 RX ADMIN — Medication 4 MG: at 13:41

## 2017-01-15 RX ADMIN — SODIUM CHLORIDE 200 ML/HR: 900 INJECTION, SOLUTION INTRAVENOUS at 12:14

## 2017-01-15 RX ADMIN — KETOROLAC TROMETHAMINE 30 MG: 30 INJECTION, SOLUTION INTRAMUSCULAR at 06:30

## 2017-01-15 RX ADMIN — ONDANSETRON 4 MG: 2 INJECTION INTRAMUSCULAR; INTRAVENOUS at 13:41

## 2017-01-15 RX ADMIN — SODIUM CHLORIDE 200 ML/HR: 900 INJECTION, SOLUTION INTRAVENOUS at 01:37

## 2017-01-15 RX ADMIN — Medication 10 ML: at 22:23

## 2017-01-15 RX ADMIN — ENOXAPARIN SODIUM 40 MG: 40 INJECTION SUBCUTANEOUS at 01:34

## 2017-01-15 RX ADMIN — SODIUM CHLORIDE 200 ML/HR: 900 INJECTION, SOLUTION INTRAVENOUS at 06:30

## 2017-01-15 RX ADMIN — ENOXAPARIN SODIUM 40 MG: 40 INJECTION SUBCUTANEOUS at 23:58

## 2017-01-15 RX ADMIN — IPRATROPIUM BROMIDE AND ALBUTEROL SULFATE 3 ML: .5; 3 SOLUTION RESPIRATORY (INHALATION) at 22:13

## 2017-01-15 RX ADMIN — IBUPROFEN 600 MG: 600 TABLET ORAL at 17:26

## 2017-01-15 RX ADMIN — IPRATROPIUM BROMIDE AND ALBUTEROL SULFATE 3 ML: 2.5; .5 SOLUTION RESPIRATORY (INHALATION) at 22:13

## 2017-01-15 NOTE — H&P
History and Physical    Patient: Juanice Scheuermann Blunt               Sex: male          DOA: 1/14/2017       YOB: 1970      Age:  42 y.o.        LOS:  LOS: 0 days        HPI:     Juanice Scheuermann Blunt is a 42 y.o. male who presents to the ED complaining of abdominal pain. Patient states he ate dinner with 2 glasses of alcohol and went to bed. He was waken out of his sleep by 10/10 bilateral flank pain. Pain was sharp and constant and made it difficult for him to breath. He denies any previous episodes. He denies a h/o gallstones. Patient is now comfortable with 0/10 pain  While in the ED, findings included; elevated lipase and liver enzymes. Elevated glucose. Patient admitted for further management. Past Medical History   Diagnosis Date    Anxiety disorder     Diabetes mellitus ([facility] [address]     Erectile dysfunction     Hypercholesteremia     Hypertension        History reviewed. No pertinent past surgical history. Social History     Social History    Marital status:      Spouse name: N/A    Number of children: N/A    Years of education: N/A     Occupational History    Not on file. Social History Main Topics    Smoking status: Never Smoker    Smokeless tobacco: Never Used    Alcohol use 2.5 oz/week     5 Cans of beer per week      Comment: OCC    Drug use: No    Sexual activity: Yes     Partners: Female     Other Topics Concern    Not on file     Social History Narrative       Family History   Problem Relation Age of Onset    Hypertension Mother     Seizures Mother     Hypertension Father     Sickle Cell Trait Sister        No Known Allergies    Review of Systems:  Positive in bold. Constitutional:  No fever or weight loss  HEENT:  No headache or visual changes  Cardiovascular:  No chest pain, no palpitations. Respiratory:  No coughing, wheezing.  + shortness of breath. GI:  + abdominal pain. No nausea or vomitting. No diarrhea  :  No hematuria or dysuria. No frequency, retention, urinary incontinence. Skin:  No rashes or moles  Neuro:  No seizures or syncope  Hematological:  No bruising or bleeding  Endocrine:  h/o diabetes,  no thyroid disease    Physical Exam:      Visit Vitals    /60    Pulse (!) 117    Temp 99.7 °F (37.6 °C)    Resp 21    Ht 5' 10\" (1.778 m)    Wt 97.5 kg (215 lb)    SpO2 92%    BMI 30.85 kg/m2       Physical Exam:  Gen:  No distress, alert, awake and oriented x 4  HEENT:  Normal cephalic atraumatic, extra-occular movements are intact. Neck:  Supple, No JVD  Lungs:  Clear bilaterally, no wheeze, no rales, normal effort  Cardiovascular:  Regular Rate and Rhythm, normal S1 and S2, no audible murmur. Capillary refill: < 3 seconds. Abdomen:  Soft, non tender, non distended, normal bowel sounds, no guarding. Extremities:  Well perfused, no cyanosis, no edema  Neurological:  Awake and alert, CN's are intact, normal strength throughout extremities  Skin:  No rashes or moles. Turgor and color normal  Mental Status:  Normal thought process, does not appear anxious      Laboratory Studies: All lab results for the last 24 hours reviewed. Assessment/Plan     Active Problems:   acute pancreatitis  DM  HTN      PLAN:  GI:  Acute pancreatitis   Keep NPO   Continuous IV fluid; [email or web address] ml/hr   Morphine 4 mg every 4 hours as needed. Zofran as needed    CV:  HTN- well controlled   Continue home antihypertensive medications    Heme:  DVT prophylaxis   Lovenox 40 mg SQ daily    Metabolic/Endo: DM   FS AC&HS with sliding scale coverage   Hold all home oral antihyperglycemics   Diabetic diet    Misc:  FULL CODE   Ambulate with assistance. Monitor intake and output   Monitor vitals as per unit   Replace electrolytes as needed. Follow up am labs.           Aad Rausch MD

## 2017-01-15 NOTE — ED NOTES
Assumed care of pt, report received from [facility]. Patient resting comfortably in NAD, side rails up and call bell in reach. Full CM on with alarms set.

## 2017-01-15 NOTE — ED NOTES
Received care of patient from [facility]. Pt resting comfortably on the stretcher with eyes closed and in no apparent distress.

## 2017-01-15 NOTE — ED PROVIDER NOTES
HPI Comments: Jonn Bazan is a 42year old male who presents to the ED with a sudden onset of severe abdominal pain that he rates at a 10:10. Pt states he went to sleep approx 1900 today. The pain came on suddenly and awoke him about 30 minutes prior to his arrival.  States he felt fine when he laid down to go to sleep. Patient is a 42 y.o. male presenting with abdominal pain. The history is provided by the patient. History limited by: no communication barrier. Abdominal Pain    This is a new problem. The current episode started 1 to 2 hours ago. The problem occurs constantly. The problem has not changed since onset. Associated with: Pt makes no association. The pain is located in the generalized abdominal region. The pain is at a severity of 10/10. Nothing worsens the pain. The pain is relieved by nothing. Past Medical History:   Diagnosis Date    Anxiety disorder     Diabetes mellitus ([facility] [address]     Erectile dysfunction     Hypercholesteremia     Hypertension        History reviewed. No pertinent past surgical history. Family History:   Problem Relation Age of Onset    Hypertension Mother     Seizures Mother     Hypertension Father     Sickle Cell Trait Sister        Social History     Social History    Marital status:      Spouse name: N/A    Number of children: N/A    Years of education: N/A     Occupational History    Not on file. Social History Main Topics    Smoking status: Never Smoker    Smokeless tobacco: Never Used    Alcohol use 2.5 oz/week     5 Cans of beer per week      Comment: OCC    Drug use: No    Sexual activity: Yes     Partners: Female     Other Topics Concern    Not on file     Social History Narrative         ALLERGIES: Review of patient's allergies indicates no known allergies. Review of Systems   Constitutional: Negative. HENT: Negative. Eyes: Negative. Respiratory: Negative. Cardiovascular: Negative.     Gastrointestinal: Positive for abdominal pain. Endocrine: Negative. Genitourinary: Negative. Musculoskeletal: Negative. Skin: Negative. Allergic/Immunologic: Negative. Neurological: Negative. Hematological: Negative. Psychiatric/Behavioral: Negative. Vitals:    01/14/17 2030   BP: 90/46   Pulse: (!) 117   Resp: 28   Temp: 99.7 °F (37.6 °C)   SpO2: 97%   Weight: 97.5 kg (215 lb)   Height: 5' 10\" (1.778 m)            Physical Exam   Constitutional: He is oriented to person, place, and time. He appears well-developed and well-nourished. No distress. HENT:   Head: Normocephalic and atraumatic. Eyes: Pupils are equal, round, and reactive to light. Neck: Normal range of motion. Neck supple. Cardiovascular: Normal rate, regular rhythm, normal heart sounds and intact distal pulses. Pulmonary/Chest: Effort normal and breath sounds normal. He has no wheezes. He has no rales. Abdominal: There is tenderness. There is no rebound and no guarding. Modestly TTP in all four quadrants without focus, but the Pt has an increased amount of pain on the lateral bords of each side. There is no guarding or rebound on exam.  Hypoactive abdominal sounds. Genitourinary:   Genitourinary Comments: NE   Musculoskeletal: Normal range of motion. Neurological: He is alert and oriented to person, place, and time. Skin: Skin is warm and dry. Psychiatric: He has a normal mood and affect. Nursing note and vitals reviewed. MDM  Number of Diagnoses or Management Options  Abdominal pain, generalized:   Acute pancreatitis, unspecified complication status, unspecified pancreatitis type:   Elevated liver enzymes:   Hyperglycemia:   Hypotension, unspecified hypotension type: Tachycardia:   Diagnosis management comments: PROGRESS NOTE:  Pt denies a Hx of pancreatitis.   Leidy Cordova NP  11:30 PM           Amount and/or Complexity of Data Reviewed  Clinical lab tests: ordered and reviewed  Tests in the radiology section of CPT®: ordered and reviewed  Review and summarize past medical records: yes  Discuss the patient with other providers: yes (Dr Rebeka Bocanegra  )  Independent visualization of images, tracings, or specimens: yes    Risk of Complications, Morbidity, and/or Mortality  Presenting problems: moderate  Diagnostic procedures: moderate  Management options: moderate    Patient Progress  Patient progress: stable    ED Course       Procedures    CONSULT:  Discussed the Pt with Dr Ashleigh Esteban, who accepted the Pt for admission  Rebeka Muse NP  11:55 PM    Diagnosis:   1. Acute pancreatitis, unspecified complication status, unspecified pancreatitis type    2. Abdominal pain, generalized    3. Elevated liver enzymes    4. Hyperglycemia    5. Tachycardia    6. Hypotension, unspecified hypotension type          Disposition:   ADMITTED - DR DORADO    Follow-up Information     None          Patient's Medications   Start Taking    No medications on file   Continue Taking    CHOLECALCIFEROL, VITAMIN D3, (VITAMIN D3) 1,000 UNIT CAP    Take  by mouth. CLOMIPRAMINE (ANAFRANIL) 25 MG CAPSULE    Take 25 mg by mouth nightly. HYDROCHLOROTHIAZIDE (HYDRODIURIL) 12.5 MG TABLET    Take 12.5 mg by mouth daily. LISINOPRIL (PRINIVIL, ZESTRIL) 20 MG TABLET    Take  by mouth daily. LORAZEPAM (ATIVAN) 0.5 MG TABLET    Take  by mouth. PIOGLITAZONE-METFORMIN (ACTOPLUS MET)  MG PER TABLET    Take 1 tablet by mouth two (2) times daily (with meals). SIMVASTATIN (ZOCOR) 10 MG TABLET    Take  by mouth nightly.    These Medications have changed    No medications on file   Stop Taking    No medications on file

## 2017-01-15 NOTE — ED NOTES
Purposeful rounding completed:    Side rails up x 2:  YES  Bed in low position and wheels locked: YES  Call bell within reach: YES  Comfort addressed: YES    Toileting needs addressed: YES  Plan of care reviewed/updated with patient and or family members: YES  IV site assessed: YES  Pain assessed and addressed: YES, 0

## 2017-01-15 NOTE — ED TRIAGE NOTES
Sudden onset bilateral lower lateral flank/abdominal pain. Known hernia. Abdomen is distended. Patient is hypotensive and tachycardic rating pain 10/10.

## 2017-01-15 NOTE — ED NOTES
Pt c/o new onset body aches, feeling hot and headache  Temperature now 102  Flu swab collected  Pt medicated for fever

## 2017-01-16 ENCOUNTER — APPOINTMENT (OUTPATIENT)
Dept: GENERAL RADIOLOGY | Age: 47
DRG: 438 | End: 2017-01-16
Attending: PHYSICIAN ASSISTANT
Payer: COMMERCIAL

## 2017-01-16 LAB
ANION GAP BLD CALC-SCNC: 12 MMOL/L (ref 3–18)
ATRIAL RATE: 104 BPM
BNP SERPL-MCNC: 152 PG/ML (ref 0–450)
BUN SERPL-MCNC: 11 MG/DL (ref 7–18)
BUN/CREAT SERPL: 13 (ref 12–20)
CALCIUM SERPL-MCNC: 8.4 MG/DL (ref 8.5–10.1)
CALCULATED P AXIS, ECG09: 49 DEGREES
CALCULATED R AXIS, ECG10: 24 DEGREES
CALCULATED T AXIS, ECG11: -7 DEGREES
CHLORIDE SERPL-SCNC: 107 MMOL/L (ref 100–108)
CO2 SERPL-SCNC: 21 MMOL/L (ref 21–32)
CREAT SERPL-MCNC: 0.85 MG/DL (ref 0.6–1.3)
DIAGNOSIS, 93000: NORMAL
EST. AVERAGE GLUCOSE BLD GHB EST-MCNC: 148 MG/DL
GLUCOSE BLD STRIP.AUTO-MCNC: 106 MG/DL (ref 70–110)
GLUCOSE BLD STRIP.AUTO-MCNC: 118 MG/DL (ref 70–110)
GLUCOSE BLD STRIP.AUTO-MCNC: 123 MG/DL (ref 70–110)
GLUCOSE BLD STRIP.AUTO-MCNC: 229 MG/DL (ref 70–110)
GLUCOSE SERPL-MCNC: 122 MG/DL (ref 74–99)
HBA1C MFR BLD: 6.8 % (ref 4.2–5.6)
LIPASE SERPL-CCNC: 130 U/L (ref 73–393)
P-R INTERVAL, ECG05: 168 MS
POTASSIUM SERPL-SCNC: 4.6 MMOL/L (ref 3.5–5.5)
Q-T INTERVAL, ECG07: 342 MS
QRS DURATION, ECG06: 84 MS
QTC CALCULATION (BEZET), ECG08: 449 MS
SODIUM SERPL-SCNC: 140 MMOL/L (ref 136–145)
VENTRICULAR RATE, ECG03: 104 BPM

## 2017-01-16 PROCEDURE — 65270000029 HC RM PRIVATE

## 2017-01-16 PROCEDURE — 82962 GLUCOSE BLOOD TEST: CPT

## 2017-01-16 PROCEDURE — 36415 COLL VENOUS BLD VENIPUNCTURE: CPT | Performed by: HOSPITALIST

## 2017-01-16 PROCEDURE — 93005 ELECTROCARDIOGRAM TRACING: CPT

## 2017-01-16 PROCEDURE — 71010 XR CHEST PORT: CPT

## 2017-01-16 PROCEDURE — 77030020263 HC SOL INJ SOD CL0.9% LFCR 1000ML

## 2017-01-16 PROCEDURE — 83880 ASSAY OF NATRIURETIC PEPTIDE: CPT | Performed by: PHYSICIAN ASSISTANT

## 2017-01-16 PROCEDURE — 83036 HEMOGLOBIN GLYCOSYLATED A1C: CPT | Performed by: HOSPITALIST

## 2017-01-16 PROCEDURE — 74011250636 HC RX REV CODE- 250/636: Performed by: HOSPITALIST

## 2017-01-16 PROCEDURE — 74011250637 HC RX REV CODE- 250/637: Performed by: HOSPITALIST

## 2017-01-16 PROCEDURE — 80048 BASIC METABOLIC PNL TOTAL CA: CPT | Performed by: PHYSICIAN ASSISTANT

## 2017-01-16 PROCEDURE — 93306 TTE W/DOPPLER COMPLETE: CPT

## 2017-01-16 PROCEDURE — 83690 ASSAY OF LIPASE: CPT | Performed by: PHYSICIAN ASSISTANT

## 2017-01-16 PROCEDURE — 74011000250 HC RX REV CODE- 250: Performed by: PHYSICIAN ASSISTANT

## 2017-01-16 PROCEDURE — 77030027138 HC INCENT SPIROMETER -A

## 2017-01-16 PROCEDURE — 74011250636 HC RX REV CODE- 250/636: Performed by: PHYSICIAN ASSISTANT

## 2017-01-16 PROCEDURE — 74011636637 HC RX REV CODE- 636/637: Performed by: HOSPITALIST

## 2017-01-16 RX ORDER — DEXTROSE 50 % IN WATER (D50W) INTRAVENOUS SYRINGE
25-50 AS NEEDED
Status: DISCONTINUED | OUTPATIENT
Start: 2017-01-16 | End: 2017-01-19 | Stop reason: HOSPADM

## 2017-01-16 RX ORDER — MAGNESIUM SULFATE 100 %
4 CRYSTALS MISCELLANEOUS AS NEEDED
Status: DISCONTINUED | OUTPATIENT
Start: 2017-01-16 | End: 2017-01-19 | Stop reason: HOSPADM

## 2017-01-16 RX ORDER — HYDROCHLOROTHIAZIDE 25 MG/1
12.5 TABLET ORAL DAILY
Status: DISCONTINUED | OUTPATIENT
Start: 2017-01-16 | End: 2017-01-19 | Stop reason: HOSPADM

## 2017-01-16 RX ORDER — LEVOFLOXACIN 5 MG/ML
500 INJECTION, SOLUTION INTRAVENOUS EVERY 24 HOURS
Status: DISCONTINUED | OUTPATIENT
Start: 2017-01-16 | End: 2017-01-19

## 2017-01-16 RX ORDER — CLOMIPRAMINE HYDROCHLORIDE 25 MG/1
25 CAPSULE ORAL
Status: DISCONTINUED | OUTPATIENT
Start: 2017-01-16 | End: 2017-01-19 | Stop reason: HOSPADM

## 2017-01-16 RX ORDER — METRONIDAZOLE 500 MG/100ML
500 INJECTION, SOLUTION INTRAVENOUS EVERY 8 HOURS
Status: DISCONTINUED | OUTPATIENT
Start: 2017-01-16 | End: 2017-01-19

## 2017-01-16 RX ORDER — LISINOPRIL 20 MG/1
20 TABLET ORAL DAILY
Status: DISCONTINUED | OUTPATIENT
Start: 2017-01-16 | End: 2017-01-19 | Stop reason: HOSPADM

## 2017-01-16 RX ORDER — INSULIN LISPRO 100 [IU]/ML
INJECTION, SOLUTION INTRAVENOUS; SUBCUTANEOUS
Status: DISCONTINUED | OUTPATIENT
Start: 2017-01-16 | End: 2017-01-19 | Stop reason: HOSPADM

## 2017-01-16 RX ADMIN — SODIUM CHLORIDE 200 ML/HR: 900 INJECTION, SOLUTION INTRAVENOUS at 03:57

## 2017-01-16 RX ADMIN — LEVOFLOXACIN 500 MG: 5 INJECTION, SOLUTION INTRAVENOUS at 14:25

## 2017-01-16 RX ADMIN — Medication 10 ML: at 06:00

## 2017-01-16 RX ADMIN — METRONIDAZOLE 500 MG: 500 INJECTION, SOLUTION INTRAVENOUS at 19:22

## 2017-01-16 RX ADMIN — INSULIN LISPRO 4 UNITS: 100 INJECTION, SOLUTION INTRAVENOUS; SUBCUTANEOUS at 17:55

## 2017-01-16 RX ADMIN — LISINOPRIL 20 MG: 20 TABLET ORAL at 08:54

## 2017-01-16 RX ADMIN — CLOMIPRAMINE HYDROCHLORIDE 25 MG: 25 CAPSULE ORAL at 22:00

## 2017-01-16 RX ADMIN — INSULIN DETEMIR 15 UNITS: 100 INJECTION, SOLUTION SUBCUTANEOUS at 17:51

## 2017-01-16 RX ADMIN — ENOXAPARIN SODIUM 40 MG: 40 INJECTION SUBCUTANEOUS at 23:11

## 2017-01-16 RX ADMIN — HYDROCHLOROTHIAZIDE 12.5 MG: 25 TABLET ORAL at 08:55

## 2017-01-16 RX ADMIN — METRONIDAZOLE 500 MG: 500 INJECTION, SOLUTION INTRAVENOUS at 13:22

## 2017-01-16 RX ADMIN — Medication 4 MG: at 09:50

## 2017-01-16 RX ADMIN — SODIUM CHLORIDE 200 ML/HR: 900 INJECTION, SOLUTION INTRAVENOUS at 08:35

## 2017-01-16 RX ADMIN — Medication 10 ML: at 14:25

## 2017-01-16 NOTE — PROGRESS NOTES
[facility]   Discharge Planning/ Assessment    Reasons for Intervention: Chart reviewed and pt verified demographics. Pt's NOK is his wife Valdez Brandt [phone]. She is also his designated person for discharge instructions and his transportation home. His insurance is goodideazs and his PCP is Dr Liam Willard. He lives with his wife and his 3 children. He is independent with ADL. He does have a cpap machine with out oxygen at home from [employer]. Plan is home when medically ready. Will continue to follow for needs Rhiannon MARSHALL EwingKeturah     High Risk Criteria  [] Yes  [x]No   Physician Referral  [] Yes  [x]No        Date    Nursing Referral  [] Yes  [x]No        Date    Patient/Family Request  [] Yes  [x]No        Date       Resources:    Medicare  [] Yes  [x]No   Medicaid  [] Yes  [x]No   No Resources  [] Yes  [x]No   Private Insurance  [x] Yes  []No    Name/Phone Number    Other  [] Yes  [x]No        (i.e. Workman's Comp)         Prior Services:    Prior Services  [x] Yes  []No   Home Health  [] Yes  [x]No   [address]  [] Yes  [x]No        Number of [address]  [] Yes  [x]No       Meals on Wheels  [] Yes  [x]No   Office on Aging  [] Yes  [x]No   Transportation Services  [] Yes  [x]No   Nursing Home  [] Yes  [x]No        Nursing Home Name    [facility]  [] Yes  [x]No        [address] Name    Other DME \"sleepmed\"      Information Source:      Information obtained from  [x] Patient  [] Parent   [] [address]   [] Child  [] Spouse   [] Significant Other/Partner   [] Friend      [] EMS    [] Nursing Home Chart          [] Other:   Chart Review  [] Yes  []No     Family/Support System:    Patient lives with  [] Alone    [x] Spouse   [] Significant Other  [x] Children  [] Caretaker   [] Parent  [] Sibling     [] Other       Other Support System:    Is the patient responsible for care of others  [x] Yes  []No   Information of person caring for patient on  discharge    Managers financial affairs independently  [x] Yes  []No   If no, explain:      Status Prior to Admission:    Mental Status  [x] Awake  [x] Alert  [x] Oriented  [] Quiet/Calm [] Lethargic/Sedated   [] Disoriented  [] Restless/Anxious  [] Combative   Personal Care  [] Dependent  [x] [address]  [] Requires Assistance   Meal Preparation Ability  [x] Independent   [] Standby Assistance   [] Minimal Assistance   [] Moderate Assistance  [] Maximum Assistance     [] Total Assistance   Chores  [x] Independent with Chores   [] N/A Nursing Home Resident   [] Requires Assistance   Bowel/Bladder  [x] Continent  [] Catheter  [] Incontinent  [] Ostomy Self-Care    [] Urine Diversion Self-Care  [] Maximum Assistance     [] Total Assistance   Number of Persons needed for assistance    DME at home  [] Osman Li Parent  [] Gabriela Li   [] Commode    [] Bathroom/Grab Bars  [] Hospital Bed  [] Nebulizer  [] Oxygen           [] Raised Toilet Seat  [] Shower Chair  [] Side Rails for Bed   [] Tub Transfer Bench   [] Mozella Plain  [] Belita Lylyop, Standard      [x] Other: CPAP   Vendor      Treatment Presently Receiving:    Current Treatments  [] Chemotherapy  [] Dialysis  [] Insulin  [] IVAB [x] IVF   [x] O2  [] PCA   [] PT   [] RT   [] Tube Feedings   [] Wound Care     Psychosocial Evaluation:    Verbalized Knowledge of Disease Process  [] Patient  []Family   Coping with Disease Process  [] Patient  []Family   Requires Further Counseling Coping with Disease Process  [] Patient  []Family     Identified Projected Needs:    Home Health Aid  [] Yes  [x]No   Transportation  [] Yes  [x]No   Education  [] Yes  [x]No        Specific Education     Financial Counseling  [] Yes  [x]No   Inability to Care for Self/Will Require 24 hour care  [] Yes  [x]No   Pain Management  [] Yes  [x]No   Home Infusion Therapy  [] Yes  [x]No   Oxygen Therapy  [] Yes  [x]No   DME  [] Yes  [x]No   Long Term Care Placement  [] Yes [x]No   Rehab  [] Yes  [x]No   Physical Therapy  [] Yes  [x]No   Needs Anticipated At This Time  [] Yes  [x]No     Intra-Hospital Referral:    [facility]  [] Yes  [x]No     [] Yes  [x]No   Patient Representative  [] Yes  [x]No   Staff for Teaching Needs  [] Yes  [x]No   Specialty Teaching Needs     Diabetic Educator  [] Yes  [x]No   Referral for Diabetic Educator Needed  [] Yes  [x]No  If Yes, place order for Nutritionist or Diabetic Consult     Tentative Discharge Plan:    Home with No Services  [x] Yes  []No   Home with [address]  [] Yes  [x]No        If Yes, specify type    Home Care Program  [] Yes  [x]No        If Yes, specify type    Meals on Wheels  [] Yes  [x]No   Office of Aging  [] Yes  [x]No   NHP  [] Yes  [x]No   Return to the Nursing Home  [] Yes  [x]No   Rehab Therapy  [] Yes  [x]No   Acute Rehab  [] Yes  [x]No   Subacute Rehab  [] Yes  [x]No   Private Care  [] Yes  [x]No   Substance Abuse Referral  [] Yes  [x]No   Transportation  [] Yes  [x]No   Chore Service  [] Yes  [x]No   Inpatient Hospice  [] Yes  [x]No   OP RT  [] Yes  [x] No   OP Hemo  [] Yes  [x] No   OP PT  [] Yes  [x]No   Support Group  [] Yes  [x]No   Reach to Recovery  [] Yes  [x]No   OP Oncology Clinic  [] Yes  [x]No   Clinic Appointment  [] Yes  [x]No   DME  [] Yes  [x]No   Comments    Name of D/C Planner or  Given to Patient or Family Miguel Angel Mccann. Zamzam Hall RN   Phone Number         Extension [phone]   Date 01/16/2017   Time    If you are discharged home, whom do you designate to participate in your discharge plan and receive any information needed?      Enter name of jere Wife Joanne Brown Blunt        Phone # of rashadgayatri [phone]        Address of jere [address] [place] [address] Marylen Clever [place], [address]        Updated         Patient refused to designate any           individual

## 2017-01-16 NOTE — ED NOTES
Purposeful rounding completed:    Side rails up x 1:  YES  Bed in low position and wheels locked: YES  Call bell within reach: YES  Comfort addressed: YES    Toileting needs addressed: YES  Plan of care reviewed/updated with patient and or family members: YES  IV site assessed: YES  Pain assessed and addressed: YES, 5, upper back  Awaiting for inpatient bed, and RT for CPAP placement.

## 2017-01-16 NOTE — ADT AUTH CERT NOTES
Dear Jocelyn Hinton Dept:    I am not able to set up request for auth online. [employer] is closed for holiday today. Filipet: Header: Unable to Respond at Current Time (SUGEY Reference Number: [id number]). Apple Trace #: [phone] Payer Contact Information . Connectivity Error. Unable to connect to back-end. Thanks,     Facility Name: [address] [place] [address]                      Patient Demographics      Patient Name Sex  Address Phone     Socorro Massimo Darshana Anderson Male 1970 [address] [phone] (Home)  [phone] (Work)  [phone] [facility] Account      Name Acct ID Class Status Primary Coverage     Darshana Brandt [id number] INPATIENT Open AETNA - BSHSI AETNA PPO              Guarantor Account (for Hospital Account [de-identified])      Name Relation to Pt Service Area Active? Acct Type     Darshana Brandt Self [employer] Yes Personal/Family     Address Phone           [address] [facility], [address] [place] [address] Drive [phone](G)  [phone](D)                Coverage Information (for Hospital Account [de-identified])      F/O Payor/Plan Subscriber  Subscriber Sex Precert #     AETNA/BSHSI AETNA PPO [address]      Subscriber Subscriber #     Job Reason [id number]     [facility] # Group Name     [id number] [employer]     Address Phone     [address]  [place] [id number]      Policy Number Status Effective Date Benefits Phone     [id number] -  -     Auth/Cert                   Diagnosis         Codes Comments     Acute pancreatitis, unspecified complication status, unspecified pancreatitis type  ICD-10-CM: K85.90  ICD-9-CM: 615.1        Admission Information - Patient Record Only      Arrival Date/Time: 2017 Admit Date/Time: 2017 IP Adm.  Date/Time: 01/15/2017 0630     Admission Type: Emergency Point of Origin: Non-health Care Facility/self Admit Category:      Means of Arrival: Ambulance Primary Service: Medicine Secondary Service: N/A     Transfer Source:  Service Area: [address] [place] [address] Unit: [facility]     Admit Provider: Markos Lyn MD Attending Provider: Paul Figueroa DO Referring Provider:        Admission Information      Attending Provider Admission Dx Admitted On     Minesh Spring MD  17     Service Isolation Code Status     MEDICINE  Full Code     Allergies           No Known Allergies         Admission Information      Unit/Bed: [facility]/ Service: MEDICINE     Admitting provider: Markos Lyn MD Phone: [phone]     Attending provider: Minesh Spring MD Phone: [phone]     PCP: Omari Mitchell MD Phone: [phone]     Admission dx: Acute pancreatitis Patient class:  I     Admission type: ER         Patient Demographics      Patient Name [address] Sex  Address Phone     Anna Malagon [id number] Male 1970 [address] [facility] [phone] (Home)  [phone] (Work)  [phone] (Mobile)         CSN:     [id number]         Admit Date: Admit Time Room Bed     2017  8:27 PM 2212 [85972] 89 [11724]       Attending Providers      Provider Pager From To   [phone] Merari Pepper DO  01/14/17 01/15/17     Minesh Spring MD  01/15/17 01/16/17     Markos Lyn MD  17     Minesh Spring MD  17        Emergency Contact(s)      Name Relation Home Work [address] [place] [address]. Blunt Spouse [phone]         Utilization Review         Pancreatitis - Care Day 1 (1/15/2017) by Lalit Jean      Review Entered Review Status     2017 Completed     Details          Care Day: 1 Care Date: 1/15/2017 Level of Care: Inpatient Floor     Guideline Day 1      Level Of Care     (X) ICU [C] or floor          Clinical Status     (X) * Clinical Indications met [D]     (X) Pain, fever, or vomiting          Routes     (X) NPO [E]     (X) IV fluids and medications     2017 8:35 AM EST by Arabella Gotti I VFs at 200/h, lovenox sc, Morphine iv , zofran iv , duoneb, toradol iv .               Interventions     (X) Abdominal imaging     (X) Laboratory tests          Medications     (X) Parenteral analgesia          1/16/2017 8:35 AM EST by Ida Perales     Subject: Additional Clinical Information            CT ---1. Mild mural thickening of the distal transverse and descending colon. Cannot  rule out colitis of uncertain etiology. No pericolonic inflammation. 2. There is also relative distention of the proximal small bowel compared to  distal small bowel without transition zone. Findings may represent ileus. -------Lipase 1474. cr 1.47,   ALT 1263, . ------- 101-24, /85, SAT 88 ON 3 LITERS nc.   CPAP  IS ADDED.                       * Milestone          Additional Notes     GI: Acute pancreatitis. ........ [facility] Keep NPO     Continuous IV fluid; [email or web address] ml/hr. Morphine 4 mg every 4 hours &     Zofran as needed           CV: HTN- well controlled-Continue home antihypertensive medications.      Heme: DVT prophylaxis-Lovenox 40 mg SQ daily.      Metabolic/Endo: DM.FS AC&HS with sliding scale coverage     Hold all home oral antihyperglycemics.      Misc: FULL CODE     Ambulate with assistance.     Monitor intake and output       Pancreatitis - Clinical Indications for Admission to Inpatient Care by Marene Peabody      Review Entered Review Status     1/16/2017 Completed     Details          Clinical Indications for Admission to Inpatient Care     Most Recent : Ida Perales Most Recent Date: 1/16/2017 8:26 AM EST     (X) Admission is indicated for 1 or more of the following  (1) (2) (3) (4) (5) (6) (7):        (X) Acute pancreatitis [A] as indicated by 2 or more of the following :           (X) Abdominal pain (eg, epigastric, left upper quadrant)           (X) Serum amylase or serum lipase greater than 3 times the upper limit of normal           1/16/2017 8:26 AM EST by Jakub Salinas       Lipase 1474. cr 1.47,  ALT 1263, .                                   Additional Notes     To ED w/ abdominal pain. Patient states he ate dinner with 2 glasses of alcohol and went to bed. He was waken out of his sleep by 10/10 bilateral flank pain. Pain was sharp and constant and made it difficult for him to breath. He denies any previous episodes. He denies a h/o gallstones. While in the ED, findings included; elevated lipase and liver enzymes.  Elevated glucose.           PMHx- Past Medical History     Wears CPAP at home      Anxiety disorder        Diabetes mellitus (HCC)        Hypercholesteremia        Hypertension

## 2017-01-16 NOTE — ACP (ADVANCE CARE PLANNING)
Patient has designated ___Wife _____________________ to participate in his/her discharge plan and to receive any needed information.      Name: [facility] Blunt  Address: [place] [address] [place] [address]  Phone number:[phone]

## 2017-01-16 NOTE — PROGRESS NOTES
conducted an initial consultation and Spiritual Assessment for Jb Brandt, who is a 42 y.o.,male. Patients Primary Language is: Ko Pal[place]. According to the patients EMR Jainism Affiliation is: [facility].     The reason the Patient came to the hospital is:   Patient Active Problem List    Diagnosis Date Noted    Acute pancreatitis 01/15/2017    Diabetes ([facility] [address] 12/15/2014    Hypercholesteremia 12/15/2014    Erectile dysfunction 10/21/2014    Hypertension 10/21/2014    Anxiety disorder 10/21/2014        The  provided the following Interventions:  Initiated a relationship of care and support. Explored issues of royce, belief, spirituality and Baptism/ritual needs while hospitalized. Listened empathically. Provided information about Spiritual Care Services. Offered prayer and assurance of continued prayers on patient's behalf. Chart reviewed. The following outcomes were achieved:  Patient shared limited information about both their medical narrative and spiritual journey/beliefs. Patient processed feeling about current hospitalization. Patient expressed gratitude for 's visit. Assessment:  Patient does not have any Baptism/cultural needs that will affect patients preferences in health care. There are no further spiritual or Baptism issues which require intervention at this time. Plan:  Chaplains will continue to follow and will provide pastoral care on an as needed/requested basis.  recommends bedside caregivers page  on duty if patient shows signs of acute spiritual or emotional distress. Margaret Lopez M.Div.   [facility] [address]  [phone]

## 2017-01-16 NOTE — PROGRESS NOTES
0400 Pt arrived on unit from ED. Oriented pt to room and whiteboard. Explained call light. Flushed IVs. Pt has no complaints at this time, will continue to monitor. Bed in lowest position, call bell within reach, will continue to monitor. 0600 Pt lying in bed with no complaints at this time. Bed in lowest position, call bell within reach, will continue to monitor.

## 2017-01-16 NOTE — ED NOTES
Purposeful rounding completed:    Side rails up x 1:  YES  Bed in low position and wheels locked: YES  Call bell within reach: YES  Comfort addressed: YES    Toileting needs addressed: YES  Plan of care reviewed/updated with patient and or family members: YES  IV site assessed: YES  Pain assessed and addressed: YES  Pt with RT at bedside for CPAP placement.

## 2017-01-16 NOTE — ED NOTES
Purposeful rounding completed:    Side rails up x 1:  YES  Bed in low position and wheels locked: YES  Call bell within reach: YES  Comfort addressed: YES    Toileting needs addressed: NO  Plan of care reviewed/updated with patient and or family members: NO  IV site assessed: YES  Pain assessed and addressed: YES, pt sleeping, remains on CPAP, satting 100%.

## 2017-01-16 NOTE — PROGRESS NOTES
Progress Note      Patient: Juanice Scheuermann Blunt               Sex: male          DOA: 1/14/2017       YOB: 1970      Age:  42 y.o.        LOS:  LOS: 1 day             CHIEF COMPLAINT:  Pancreatitis, improving    Subjective:     Patient feels okay  Less abdominal pain  Lipase is improved    Objective:      Visit Vitals    /84 (BP 1 Location: Right arm, BP Patient Position: At rest)    Pulse (!) 110    Temp 98.4 °F (36.9 °C)    Resp 20    Ht 5' 10\" (1.778 m)    Wt 97.5 kg (215 lb)    SpO2 98%    BMI 30.85 kg/m2       Physical Exam:  Gen:  No distress, no complaint  Lungs:  Clear bilaterally, no wheeze or rhonchi  Heart:  Regular rate and rhythm, no murmurs or gallops  Abdomen:  Soft, non-tender, normal bowel sounds        Lab/Data Reviewed:  BMP:   Lab Results   Component Value Date/Time     01/16/2017 11:05 AM    K 4.6 01/16/2017 11:05 AM     01/16/2017 11:05 AM    CO2 21 01/16/2017 11:05 AM    AGAP 12 01/16/2017 11:05 AM     (H) 01/16/2017 11:05 AM    BUN 11 01/16/2017 11:05 AM    CREA 0.85 01/16/2017 11:05 AM    GFRAA >60 01/16/2017 11:05 AM    GFRNA >60 01/16/2017 11:05 AM         Assessment/Plan     Active Problems:    Acute pancreatitis (1/15/2017)        Plan:  Lipase has improved. There is no evidence of gallstones  There is no major alcohol history according to patient  This may represent Pancreatitis secondary to Zocor or virus. Resume diet  Potential discharge tomorrow.

## 2017-01-16 NOTE — ED NOTES
Pt O2 sats 88% on 3 liters NC. Pt denies SOB and does not appear in any acute resp distress. This was discussed with Dr. Tamanna Hayden and CXR was ordered.

## 2017-01-16 NOTE — ED NOTES
Purposeful rounding completed:    Side rails up x 1:  YES  Bed in low position and wheels locked: YES  Call bell within reach: YES  Comfort addressed: YES    Toileting needs addressed: YES  Plan of care reviewed/updated with patient and or family members: YES  IV site assessed: YES  Denies any pain at this time.

## 2017-01-16 NOTE — PROGRESS NOTES
Discussed in IDR's  Patient noted to feel SOB with Desats 89-90% on RA. Placed on O2. Remains Tachycardic with HR in 110''s  CXR yesterday with pulmonary edema picture  Fever of 102 yesterday  CT appears to show colitis with ileus      Check Echo, BNP, BMP, EKG, repeat CXR  Start Flagyl and Levaquin for possible colitis and fever  Repeat lipase. Continue NPO.  Lowered dose of fluids from 200 ml/hr to 100 ml/hr  May need GI consult      Priyanka Albright PA-C

## 2017-01-16 NOTE — PROGRESS NOTES
0730 Received bedside shift report from off going night nurse Lorenza Orosco. 0800 Routine hourly check provided. 0900 Scheduled medication and PRN pain medication administrred as per order. 1000 Patient O2  saturation decreased in the low 90 O2 at 2/L via N/C  Administered for comfort measure. [address] Brianne Godinezkarlee ordered and completed. Received new orders for Antibiotic Flagyl and Levofloxacin IVPB and IV Sodium Chloride flow rate to decrease from 200 ml/hr to 100 ml/hr. 1200 patient tolerate lunch 60% with no S/S of N/V.  1300 Patient appears calm observed No  S/S of distress at present time. Spouse  And children at bedside for support. 1600 patient  Appears calm at present time. 1700 Schedule medications administered as per order. 1830 Patient self repositioned for comfort, safety in place. 1920 Bedside and Verbal shift change report given to (oncoming nurse) Madhu Nayak  RN by Román Lamas RN. (offgoing nurse). Report included the following information SBAR, Kardex and MAR.

## 2017-01-16 NOTE — PROGRESS NOTES
Called to pt bedside to assess for cpap. Pt wears at home. Pt SP02 in low 90's. 02 turned up. Pt improved to 94-95%. He continued to dip down into high 80's. 02 up to 5L. Ultimately brought CPAP to pt in ER and not wait to move him.

## 2017-01-16 NOTE — PROGRESS NOTES
Bedside and Verbal shift change report given to Sara Amaro RN (oncoming nurse) by Evonne Parikh RN (offgoing nurse). Report included the following information SBAR, Kardex, Intake/Output, MAR and Recent Results.

## 2017-01-16 NOTE — PROGRESS NOTES
Nutrition initial assessment/Plan of care      RECOMMENDATIONS:   1. NPO. Advance diet per patient tolerance  2. Monitor weight and PO intake  3. RD to follow     GOALS:   1. PO intake meets >75% of protein/calorie needs by 1/21  2. Weight Maintenance/Gradual weight loss (1-2 lb) by 1/23       ASSESSMENT:   Per BMI of 30.9, weight is in the obesity classification. PO intake is poor vs NPO order. Labs noted. Pt with elevated lipase level at admission. Average BG in the last 24 hours: 123-214. Nutrition recommendations listed. RD to follow. Nutrition Diagnoses: Altered GI function related to pancreatitis as evidenced by NPO order. Nutrition Risk:  [] High  [x] Moderate []  Low    SUBJECTIVE/OBJECTIVE:   Pt admitted for pancreatitis. He has h/o of diabetes. Per chart review, weight was 219 lb on 11/09. Pt states that his weight was 224 lb a few months ago. He denies N/V/D/C. Pt was trying to lose some weight by not eating as much. No known food allergy. Pt states that he eats mostly bake food and drinks alcohol in moderation. Will follow. Information Obtained from:    [x] Chart Review   [x] Patient   [] Family/Caregiver   [] Nurse/Physician   [] Interdisciplinary Meeting/Rounds    Diet: NPO  Medications: [x] Reviewed (Levemir, Lispro, 0.9%NaCl: 100 ml/hr)    Allergies: [x] Reviewed   Encounter Diagnoses     ICD-10-CM ICD-9-CM   1. Acute pancreatitis, unspecified complication status, unspecified pancreatitis type K85.90 577.0   2. Abdominal pain, generalized R10.84 789.07   3. Elevated liver enzymes R74.8 790.5   4. Hyperglycemia R73.9 790.29   5. Tachycardia R00.0 785.0   6.  Hypotension, unspecified hypotension type I95.9 458.9     Past Medical History   Diagnosis Date    Anxiety disorder     Diabetes mellitus ([facility] [address]     Erectile dysfunction     Hypercholesteremia     Hypertension       Labs:    Lab Results   Component Value Date/Time    Sodium 142 01/15/2017 06:06 AM    Potassium 4.2 01/15/2017 06:06 AM    Chloride 108 01/15/2017 06:06 AM    CO2 25 01/15/2017 06:06 AM    Anion gap 9 01/15/2017 06:06 AM    Glucose 179 01/15/2017 06:06 AM    BUN 14 01/15/2017 06:06 AM    Creatinine 1.30 01/15/2017 06:06 AM    Calcium 8.4 01/15/2017 06:06 AM    Albumin 4.4 01/14/2017 08:42 PM     Anthropometrics: BMI (calculated): 30.9  Last 3 Recorded Weights in this Encounter    01/14/17 2030   Weight: 97.5 kg (215 lb)      Ht Readings from Last 1 Encounters:   01/14/17 5' 10\" (1.778 m)     IBW: 166 lb %IBW: 130% UBW: 224 lb %UBW: 96%   [x] Weight Loss [] Weight Gain [] Weight Stable    Estimated Nutrition Needs: [x] MSJ  [] Other:  Calories: 6991-7417 kcal Based on:   [x] Actual BW    Protein:    g Based on:   [x] IBW    Fluid:       9078-1650 ml Based on:   [x] Actual BW      [x] No Cultural, Presybeterian or ethnic dietary need identified.     [] Cultural, Presybeterian and ethnic food preferences identified and addressed     Wt Status:  [] Normal (18.6 - 24.9) [] Underweight (<18.5) [] Overweight (25 - 29.9) [x] Mild Obesity (30 - 34.9)  [] Moderate Obesity (35 - 39.9) [] Morbid Obesity (40+)   [] Moderate Malnutrition [] Severe Malnutrition in the context of :     Nutrition Problems Identified:   [x] Suboptimal PO intake   [] Food Allergies  [] Difficulty chewing/swallowing/poor dentition  [] Constipation/Diarrhea   [] Nausea/Vomiting   [] None  [] Other:     Plan:   [] Therapeutic Diet  []  Obtained/adjusted food preferences/tolerances and/or snacks options   []  Supplements added   [] Occupational therapy following for feeding techniques  []  HS snack added   []  Modify diet texture   []  Modify diet for food allergies   [x]  Educate patient (low fat, no alcohol)  []  Assist with menu selection   []  Monitor PO intake on meal rounds   [x]  Continue inpatient monitoring and intervention   []  Participated in discharge planning/Interdisciplinary rounds/Team meetings   []  Other:     Education Needs:   [] Not appropriate for teaching at this time due to:   [x] Identified and addressed    Nutrition Monitoring and Evaluation:  [x] Continue ongoing monitoring and intervention  [] Other    Talat Pelayo RD

## 2017-01-17 ENCOUNTER — APPOINTMENT (OUTPATIENT)
Dept: CT IMAGING | Age: 47
DRG: 438 | End: 2017-01-17
Attending: NURSE PRACTITIONER
Payer: COMMERCIAL

## 2017-01-17 LAB
AMPHET UR QL SCN: NEGATIVE
ANION GAP BLD CALC-SCNC: 10 MMOL/L (ref 3–18)
BARBITURATES UR QL SCN: NEGATIVE
BASOPHILS # BLD AUTO: 0 K/UL (ref 0–0.06)
BASOPHILS # BLD: 0 % (ref 0–2)
BENZODIAZ UR QL: NEGATIVE
BNP SERPL-MCNC: 46 PG/ML (ref 0–450)
BUN SERPL-MCNC: 8 MG/DL (ref 7–18)
BUN/CREAT SERPL: 10 (ref 12–20)
CALCIUM SERPL-MCNC: 8.7 MG/DL (ref 8.5–10.1)
CANNABINOIDS UR QL SCN: NEGATIVE
CHLORIDE SERPL-SCNC: 104 MMOL/L (ref 100–108)
CO2 SERPL-SCNC: 24 MMOL/L (ref 21–32)
COCAINE UR QL SCN: NEGATIVE
CREAT SERPL-MCNC: 0.77 MG/DL (ref 0.6–1.3)
DIFFERENTIAL METHOD BLD: ABNORMAL
EOSINOPHIL # BLD: 0 K/UL (ref 0–0.4)
EOSINOPHIL NFR BLD: 0 % (ref 0–5)
ERYTHROCYTE [DISTWIDTH] IN BLOOD BY AUTOMATED COUNT: 14 % (ref 11.6–14.5)
GLUCOSE BLD STRIP.AUTO-MCNC: 120 MG/DL (ref 70–110)
GLUCOSE BLD STRIP.AUTO-MCNC: 125 MG/DL (ref 70–110)
GLUCOSE BLD STRIP.AUTO-MCNC: 129 MG/DL (ref 70–110)
GLUCOSE BLD STRIP.AUTO-MCNC: 135 MG/DL (ref 70–110)
GLUCOSE SERPL-MCNC: 164 MG/DL (ref 74–99)
HCT VFR BLD AUTO: 33.1 % (ref 36–48)
HDSCOM,HDSCOM: ABNORMAL
HGB BLD-MCNC: 11 G/DL (ref 13–16)
LIPASE SERPL-CCNC: 132 U/L (ref 73–393)
LYMPHOCYTES # BLD AUTO: 10 % (ref 21–52)
LYMPHOCYTES # BLD: 1 K/UL (ref 0.9–3.6)
MCH RBC QN AUTO: 29.7 PG (ref 24–34)
MCHC RBC AUTO-ENTMCNC: 33.2 G/DL (ref 31–37)
MCV RBC AUTO: 89.5 FL (ref 74–97)
METHADONE UR QL: NEGATIVE
MONOCYTES # BLD: 0.5 K/UL (ref 0.05–1.2)
MONOCYTES NFR BLD AUTO: 5 % (ref 3–10)
NEUTS SEG # BLD: 8.7 K/UL (ref 1.8–8)
NEUTS SEG NFR BLD AUTO: 85 % (ref 40–73)
OPIATES UR QL: POSITIVE
PCP UR QL: NEGATIVE
PLATELET # BLD AUTO: 175 K/UL (ref 135–420)
PMV BLD AUTO: 10.7 FL (ref 9.2–11.8)
POTASSIUM SERPL-SCNC: 4.1 MMOL/L (ref 3.5–5.5)
RBC # BLD AUTO: 3.7 M/UL (ref 4.7–5.5)
SODIUM SERPL-SCNC: 138 MMOL/L (ref 136–145)
TROPONIN I SERPL-MCNC: <0.02 NG/ML (ref 0–0.04)
WBC # BLD AUTO: 10.3 K/UL (ref 4.6–13.2)

## 2017-01-17 PROCEDURE — 74011250636 HC RX REV CODE- 250/636: Performed by: PHYSICIAN ASSISTANT

## 2017-01-17 PROCEDURE — 71275 CT ANGIOGRAPHY CHEST: CPT

## 2017-01-17 PROCEDURE — 74011000250 HC RX REV CODE- 250: Performed by: PHYSICIAN ASSISTANT

## 2017-01-17 PROCEDURE — 82962 GLUCOSE BLOOD TEST: CPT

## 2017-01-17 PROCEDURE — 77030020263 HC SOL INJ SOD CL0.9% LFCR 1000ML

## 2017-01-17 PROCEDURE — 74011636320 HC RX REV CODE- 636/320: Performed by: HOSPITALIST

## 2017-01-17 PROCEDURE — 74011250636 HC RX REV CODE- 250/636: Performed by: HOSPITALIST

## 2017-01-17 PROCEDURE — 87040 BLOOD CULTURE FOR BACTERIA: CPT | Performed by: INTERNAL MEDICINE

## 2017-01-17 PROCEDURE — 74011250636 HC RX REV CODE- 250/636: Performed by: INTERNAL MEDICINE

## 2017-01-17 PROCEDURE — 84484 ASSAY OF TROPONIN QUANT: CPT | Performed by: INTERNAL MEDICINE

## 2017-01-17 PROCEDURE — 85025 COMPLETE CBC W/AUTO DIFF WBC: CPT | Performed by: NURSE PRACTITIONER

## 2017-01-17 PROCEDURE — 74011250637 HC RX REV CODE- 250/637: Performed by: HOSPITALIST

## 2017-01-17 PROCEDURE — 83690 ASSAY OF LIPASE: CPT | Performed by: HOSPITALIST

## 2017-01-17 PROCEDURE — 74011636637 HC RX REV CODE- 636/637: Performed by: HOSPITALIST

## 2017-01-17 PROCEDURE — 80048 BASIC METABOLIC PNL TOTAL CA: CPT | Performed by: NURSE PRACTITIONER

## 2017-01-17 PROCEDURE — 74011000258 HC RX REV CODE- 258: Performed by: HOSPITALIST

## 2017-01-17 PROCEDURE — 83880 ASSAY OF NATRIURETIC PEPTIDE: CPT | Performed by: INTERNAL MEDICINE

## 2017-01-17 PROCEDURE — 36415 COLL VENOUS BLD VENIPUNCTURE: CPT | Performed by: HOSPITALIST

## 2017-01-17 PROCEDURE — 80307 DRUG TEST PRSMV CHEM ANLYZR: CPT | Performed by: INTERNAL MEDICINE

## 2017-01-17 PROCEDURE — 65270000029 HC RM PRIVATE

## 2017-01-17 RX ORDER — FUROSEMIDE 10 MG/ML
40 INJECTION INTRAMUSCULAR; INTRAVENOUS ONCE
Status: COMPLETED | OUTPATIENT
Start: 2017-01-17 | End: 2017-01-17

## 2017-01-17 RX ORDER — FUROSEMIDE 10 MG/ML
40 INJECTION INTRAMUSCULAR; INTRAVENOUS EVERY 12 HOURS
Status: DISCONTINUED | OUTPATIENT
Start: 2017-01-17 | End: 2017-01-19 | Stop reason: HOSPADM

## 2017-01-17 RX ORDER — SODIUM CHLORIDE 9 MG/ML
100 INJECTION, SOLUTION INTRAVENOUS
Status: COMPLETED | OUTPATIENT
Start: 2017-01-17 | End: 2017-01-17

## 2017-01-17 RX ADMIN — METRONIDAZOLE 500 MG: 500 INJECTION, SOLUTION INTRAVENOUS at 12:07

## 2017-01-17 RX ADMIN — ENOXAPARIN SODIUM 40 MG: 40 INJECTION SUBCUTANEOUS at 23:48

## 2017-01-17 RX ADMIN — SODIUM CHLORIDE 100 ML/HR: 900 INJECTION, SOLUTION INTRAVENOUS at 05:40

## 2017-01-17 RX ADMIN — METRONIDAZOLE 500 MG: 500 INJECTION, SOLUTION INTRAVENOUS at 03:15

## 2017-01-17 RX ADMIN — SODIUM CHLORIDE 100 ML: 900 INJECTION, SOLUTION INTRAVENOUS at 11:23

## 2017-01-17 RX ADMIN — CLOMIPRAMINE HYDROCHLORIDE 25 MG: 25 CAPSULE ORAL at 22:00

## 2017-01-17 RX ADMIN — LISINOPRIL 20 MG: 20 TABLET ORAL at 09:18

## 2017-01-17 RX ADMIN — INSULIN DETEMIR 15 UNITS: 100 INJECTION, SOLUTION SUBCUTANEOUS at 17:25

## 2017-01-17 RX ADMIN — IOPAMIDOL 72 ML: 755 INJECTION, SOLUTION INTRAVENOUS at 11:23

## 2017-01-17 RX ADMIN — FUROSEMIDE 40 MG: 10 INJECTION, SOLUTION INTRAMUSCULAR; INTRAVENOUS at 20:33

## 2017-01-17 RX ADMIN — LEVOFLOXACIN 500 MG: 5 INJECTION, SOLUTION INTRAVENOUS at 13:57

## 2017-01-17 RX ADMIN — HYDROCHLOROTHIAZIDE 12.5 MG: 25 TABLET ORAL at 09:18

## 2017-01-17 RX ADMIN — FUROSEMIDE 40 MG: 10 INJECTION, SOLUTION INTRAVENOUS at 13:57

## 2017-01-17 RX ADMIN — SODIUM CHLORIDE 2000 MG: 900 INJECTION, SOLUTION INTRAVENOUS at 18:14

## 2017-01-17 RX ADMIN — Medication 10 ML: at 17:15

## 2017-01-17 RX ADMIN — METRONIDAZOLE 500 MG: 500 INJECTION, SOLUTION INTRAVENOUS at 21:44

## 2017-01-17 RX ADMIN — Medication 10 ML: at 22:00

## 2017-01-17 NOTE — PROGRESS NOTES
[facility] Dosing Services    Consult requested by Dr. Lucia Crews for Vancomycin therapy  Indication: HCAP  Goal Level(s): 15-20 mcg/ml  Ke:  0.160  T 1/2:  4.3 hours  Vd:  58.1 liters    42 y.o., male   Height / Weight:     Ht Readings from Last 1 Encounters:   01/14/17 177.8 cm (70\")        Wt Readings from Last 1 Encounters:   01/14/17 97.5 kg (215 lb)      Temp: 98.2 °F (36.8 °C)    Serum Creatinine:   Lab Results   Component Value Date/Time    Creatinine 0.77 01/17/2017 09:45 AM     Creatinine Clearance:  Estimated Creatinine Clearance: 143.4 mL/min (based on Cr of 0.77). BUN:    Lab Results   Component Value Date/Time    BUN 8 01/17/2017 09:45 AM      WBC / C&S:    Lab Results   Component Value Date/Time    WBC 10.3 01/17/2017 09:45 AM       Other Current Antibiotics:    Current Antimicrobial Therapy (168h ago through future)      Ordered     Start Stop      01/17/17 1551  VANCOMYCIN INFORMATION NOTE  Other,   ONCE      01/19/17 1600 01/20/17 0359    01/17/17 1542  vancomycin (VANCOCIN) 1,500 mg in 0.9% sodium chloride 500 mL IVPB  1,500 mg,   IntraVENous,   EVERY 12 HOURS      01/18/17 0500 --    01/17/17 1536  vancomycin (VANCOCIN) 2,000 mg in 0.9% sodium chloride 500 mL IVPB  2,000 mg,   IntraVENous,   ONCE      01/17/17 1700 01/18/17 0459    01/16/17 1033  metroNIDAZOLE (FLAGYL) IVPB premix 500 mg  500 mg,   IntraVENous,   EVERY 8 HOURS      01/16/17 1100 --    01/16/17 1033  levoFLOXacin (LEVAQUIN) 500 mg in D5W IVPB  500 mg,   IntraVENous,   EVERY 24 HOURS      01/16/17 1100 --              Initiate therapy with loading dose of: Vancomycin IV 2 grams for one dose today at 5 PM.    Follow with maintenance dose of:  Vancomycin 1.5 grams IV every 12 hours. A vancomycin serum trough has been ordered for Thursday afternoon, 19 January, before the fourth maintenance dose. Pharmacy will follow daily and make changes to dose and/or frequency as needed. BON Pompa, Pharmacist  1/17/2017 3:52 PM

## 2017-01-17 NOTE — PROGRESS NOTES
[facility]  Hospitalist Division        Inpatient Daily Progress Note    Daily progress Note    Patient: Sarah Beth Brandt MRN: [id number]  CSN: [id number]    YOB: 1970  Age: 42 y.o. Sex: male    DOA: 1/14/2017 LOS:  LOS: 2 days                    Chief Complaint:  Abdominal pain       Subjective:      Patient sitting in bed. Noted patient to be on 5L O2 via NC. Patient reported feeling short of breath with inspiration and exertion. Overnight events include oxygen desaturation into the 80s requiring 5L O2 via NC. O2 decreased by this provider and patient desaturated to 91& on 2L O2--> increased back to 5L and returned to 96%. Abdominal pain significantly decreased since yesterday. Objective:      Visit Vitals    BP (!) 152/97 (BP 1 Location: Right arm, BP Patient Position: At rest)    Pulse (!) 103    Temp 97 °F (36.1 °C)    Resp 28    Ht 5' 10\" (1.778 m)    Wt 97.5 kg (215 lb)    SpO2 (!) 89%    BMI 30.85 kg/m2           Review of Systems:  - fever, - chills, + fatigue, - weight loss, - night sweats   - sore throat, - sinus congestion, - lymphadenopathy, - vision changes  - CP, -  palpitations  + dyspnea on exertion, + dyspnea at rest, + cough, - hemoptysis  - nausea, - vomiting, - diarrhea, + abdominal pain, - reflux, - dysphagia  - dysuria, - hematuria, - urinary frequency  - rash, - pruritis  - back pain, - neck pain, - myalgia, - arthralgia  - H/A, - numbness, - tingling, - weakness, - slurred speech      Physical Exam:  General appearance: alert and oriented, cooperative, no distress, appears stated age  Head: Normocephalic, without obvious abnormality, atraumatic  Lungs: bases diminished bilaterally   Heart: tachycardic rate and rhythm, S1, S2 normal, no murmur, click, rub or gallop  Abdomen: soft, mild tenderness to palpation at umbilicus, non distended. Normoactive bowel sounds.  No masses, no organomegaly  Extremities: extremities normal, atraumatic, no cyanosis or edema.  BLE compartments soft, non tender   Skin: Skin color, texture, turgor normal. No rashes or lesions  Neurologic: Grossly normal  PSY: Mood and affect normal, appropriately behaved        Intake and Output:  Current Shift:     Last three shifts:  01/15 1901 - 01/17 0700  In: 4446.7 [P.O.:480; I.V.:3966.7]  Out: 1800 [Urine:1800]    Recent Results (from the past 24 hour(s))   PRO-BNP    Collection Time: 01/16/17 11:05 AM   Result Value Ref Range    NT pro- 0 - 702 PG/ML   METABOLIC PANEL, BASIC    Collection Time: 01/16/17 11:05 AM   Result Value Ref Range    Sodium 140 136 - 145 mmol/L    Potassium 4.6 3.5 - 5.5 mmol/L    Chloride 107 100 - 108 mmol/L    CO2 21 21 - 32 mmol/L    Anion gap 12 3.0 - 18 mmol/L    Glucose 122 (H) 74 - 99 mg/dL    BUN 11 7.0 - 18 MG/DL    Creatinine 0.85 0.6 - 1.3 MG/DL    BUN/Creatinine ratio 13 12 - 20      GFR est AA >60 >60 ml/min/1.73m2    GFR est non-AA >60 >60 ml/min/1.73m2    Calcium 8.4 (L) 8.5 - 10.1 MG/DL   LIPASE    Collection Time: 01/16/17 11:05 AM   Result Value Ref Range    Lipase 130 73 - 393 U/L   EKG, 12 LEAD, SUBSEQUENT    Collection Time: 01/16/17 11:24 AM   Result Value Ref Range    Ventricular Rate 104 BPM    Atrial Rate 104 BPM    P-R Interval 168 ms    QRS Duration 84 ms    Q-T Interval 342 ms    QTC Calculation (Bezet) 449 ms    Calculated P Axis 49 degrees    Calculated R Axis 24 degrees    Calculated T Axis -7 degrees    Diagnosis       Sinus tachycardia  Low voltage QRS  Nonspecific T wave abnormality  Abnormal ECG  No previous ECGs available  Confirmed by Josselin Delong ([id number]) on 1/16/2017 3:48:57 PM     GLUCOSE, POC    Collection Time: 01/16/17 12:13 PM   Result Value Ref Range    Glucose (POC) 106 70 - 110 mg/dL   GLUCOSE, POC    Collection Time: 01/16/17  5:12 PM   Result Value Ref Range    Glucose (POC) 229 (H) 70 - 110 mg/dL   GLUCOSE, POC    Collection Time: 01/16/17  9:59 PM   Result Value Ref Range    Glucose (POC) 118 (H) 70 - 110 mg/dL   LIPASE    Collection Time: 01/17/17  3:20 AM   Result Value Ref Range    Lipase 132 73 - 393 U/L   GLUCOSE, POC    Collection Time: 01/17/17  5:44 AM   Result Value Ref Range    Glucose (POC) 125 (H) 70 - 110 mg/dL           Lab Results   Component Value Date/Time    Glucose 122 01/16/2017 11:05 AM    Glucose 179 01/15/2017 06:06 AM    Glucose 214 01/14/2017 08:42 PM        Assessment/Plan:     Patient Active Problem List   Diagnosis Code    Erectile dysfunction N52.9    Hypertension I10    Anxiety disorder F41.9    Diabetes ([facility] [address] E11.9    Hypercholesteremia E78.00    Acute pancreatitis K85.90       A/P:  Hypoxia: requiring 5L O2- CTA stat. Monitor respiratory status   Acute pancreatitis: resolved. ? 2/2 Zocor- continue to hold   HTN: continue HCTZ, Lisinopril. Monitor BP control   DM: stable. Continue SSI and Levemir.  Monitor glucose control  Anxiety/Depression: continue Anafranil   DVT prophylaxis: Lovenox           RADHA Evans-SHITAL [facility]  Pager:  [phone]  Office:  [phone]

## 2017-01-17 NOTE — ROUTINE PROCESS
Bedside shift change report given to Jennifer Gaitan RN (oncoming nurse) by Lucy De La Torre RN (offgoing nurse). Report included the following information SBAR, Kardex and MAR.

## 2017-01-17 NOTE — ROUTINE PROCESS
1920: assumed care of patient at bedside. Patient alert and oriented and resting in bed comfortably. Denies pain. Call bell within reach. Will continue to monitor. 2040: Received a call from AARON that pt O2 was 83% at 2 L. Pt O2 increased to 4L.  2100: Checked pt on 02 at 4 L. O2 decreased back to 2L with HOB at 90%. O2 level now 97 on 2l/min via NC. Will continue to monitor. 0030: patient is on CPAP, desat to 86% and is also tachypnea. Respiratory therapist paged to evaluate patient. 0045: O2 increased to 8L to keep O2 level above 90%. Patient appears stable. Will continue to monitor.

## 2017-01-17 NOTE — CONSULTS
Cheryl Trevizo M.D. Pulmonary Critical Care & Sleep Medicine       Pulmonary  Initial Consultation    Name: Ansley Brandt     : 1970     Date: 2017        IMPRESSION:   Patient Active Problem List   Diagnosis Code    Erectile dysfunction N52.9    Hypertension I10    Anxiety disorder F41.9    Diabetes ([facility] [address] E11.9    Hypercholesteremia E78.00    Acute pancreatitis K85.90 ·   Acute hypoxic respiratory failure  · Multilobar air space ALI vs Fluid overload vs pneumonia   PLAN: Clinical picture with bilateral effusion and alveolar infiltrate suggest more of fluid overload  ALI and Multilobar pneumonia can not be excluded due to cough and fever  Sputum Culture  Give one dose vanco  Give lasix bid for 2-4 dose and follow up with serial imaging  Follow on Echo  Troponin and BNP is ok   On levaquin and flagyl for pancreatitis will cover pneumonia also   Bronchodilators  Aspiration precaution  DC iv fluids  UDS  Will follow    Repeat Xray in am  Taper oxygen as tolerated       Old records reviewed discussed results and management plan with patient  Images personally reviewed and results and labs reviewed and discussed with patient. All medication reviewed and adjusted  Plan of care discussed with patient. Subjective:  Ansley Brandt is a 42 y.o. male who presents to the ED complaining of abdominal pain. Patient states he ate dinner with 2 glasses of alcohol and went to bed. He was waken out of his sleep by 10/10 bilateral flank pain. Pain was sharp and constant and made it difficult for him to breath.  Treated for pancreatitis with iv fluids and antibiotics and they were planning to discharge him and his oxygen requirement got worse so CT chest done and pulmonary is consulted    As per chart he also had fever of 102   Cough  Not able to expectorate sputum  Occasional wheezing  No fever today  No orthopnea  No h/o Asthma   Ex cigar smoker denies any lung issues  Works in [employer]  No seasonal allergies noted    Past Medical History   Diagnosis Date    Anxiety disorder     [facility] mellitus ([facility] [address]     Erectile dysfunction     Hypercholesteremia     Hypertension        History reviewed. No pertinent past surgical history. Social History     Social History    Marital status:      Spouse name: N/A    Number of children: N/A    Years of education: N/A     Social History Main Topics    Smoking status: Never Smoker    Smokeless tobacco: Never Used    Alcohol use 2.5 oz/week     5 Cans of beer per week      Comment: OCC    Drug use: No    Sexual activity: Yes     Partners: Female     Other Topics Concern    None     Social History Narrative       Family History   Problem Relation Age of Onset    Hypertension Mother     Seizures Mother     Hypertension Father     Sickle Cell Trait Sister        No Known Allergies    .   Current Facility-Administered Medications   Medication Dose Route Frequency Provider Last Rate Last Dose    furosemide (LASIX) injection 40 mg  40 mg IntraVENous Q12H Pacheco Rivera MD        clomiPRAMINE (ANAFRANIL) capsule 25 mg (patient's own med)  25 mg Oral QHS Eusebio Rees MD   25 mg at 01/16/17 2200    lisinopril (PRINIVIL, ZESTRIL) tablet 20 mg  20 mg Oral DAILY Eusebio Rees MD   20 mg at 01/17/17 2025    hydroCHLOROthiazide (HYDRODIURIL) tablet 12.5 mg  12.5 mg Oral DAILY Eusebio Rees MD   12.5 mg at 01/17/17 9780    insulin lispro (HUMALOG) injection   SubCUTAneous AC&HS Eusebio Rees MD   Stopped at 01/16/17 2200    glucose chewable tablet 16 g  4 Tab Oral PRN Eusebio Rees MD        glucagon [facility]) injection 1 mg  1 mg IntraMUSCular PRN Eusebio Rees MD        dextrose (D50W) injection syrg 12.5-25 g  25-50 mL IntraVENous PRN Eusebio Rees MD        insulin detemir (LEVEMIR) injection 15 Units  15 Units SubCUTAneous DAILY WITH DINNER Eusebio Rees MD   15 Units at 01/16/17 1751    metroNIDAZOLE (FLAGYL) IVPB premix 500 mg  500 mg IntraVENous Q8H Omelia Simpers, [place] 100 mL/hr at 01/17/17 1207 500 mg at 01/17/17 1207    levoFLOXacin (LEVAQUIN) 500 mg in D5W IVPB  500 mg IntraVENous Q24H Omelia Simpers,  mL/hr at 01/17/17 1357 500 mg at 01/17/17 1357    morphine injection 4 mg  4 mg IntraVENous Q4H PRN Ryan Castellanos MD   4 mg at 01/16/17 0950    sodium chloride (NS) flush 5-10 mL  5-10 mL IntraVENous Q8H Ryan Castellanos MD   Stopped at 01/16/17 2200    sodium chloride (NS) flush 5-10 mL  5-10 mL IntraVENous PRN Ryan Castellanos MD   10 mL at 01/15/17 2223    ondansetron (ZOFRAN) injection 4 mg  4 mg IntraVENous Q8H PRN Ryan Castellanos MD   4 mg at 01/15/17 1341    enoxaparin (LOVENOX) injection 40 mg  40 mg SubCUTAneous Q24H Ryan Castellanos MD   40 mg at 01/16/17 2311         Review of Systems:  HEENT: No epistaxis, no nasal drainage, no difficulty in swallowing, no redness in eyes  Respiratory: as above  Cardiovascular: no chest pain, no palpitations, no chronic leg edema, no syncope  Gastrointestinal: no abd pain, no vomiting, no diarrhea, no bleeding symptoms  Genitourinary: No urinary symptoms or hematuria  Integument/breast: No ulcers or rashes  Musculoskeletal:Neg  Neurological: No focal weakness, no seizures, no headaches  Behvioral/Psych: No anxiety, no depression  Constitutional: No fever, no chills, no weight loss, no night sweats     Objective:     Visit Vitals    /87 (BP 1 Location: Right arm, BP Patient Position: At rest)    Pulse 97    Temp 98.2 °F (36.8 °C)    Resp 28    Ht 5' 10\" (1.778 m)    Wt 97.5 kg (215 lb)    SpO2 94%    BMI 30.85 kg/m2        Physical Exam:   General: comfortable, no acute distress  HEENT: pupils reactive, sclera anicteric, EOM intact  Neck: No adenopathy or thyroid swelling, no lymphadenopathy or JVD, supple  CVS: S1S2 no murmurs  RS: Mod AE bilaterally, no tactile fremitus or egophony, no accessory muscle use  Abd: soft, non tender, no hepatosplenomegaly  Neuro: non focal, awake, alert  Extrm: no leg edema, clubbing or cyanosis  Skin: no rash    Data review:       Chemistry Recent Labs      01/17/17   0945  01/16/17   1105  01/15/17   0606  01/14/17 2042   GLU  164*  122*  179*  214*   NA  138  140  142  139   K  4.1  4.6  4.2  3.6   CL  104  107  108  102   CO2  24  21  25  22   BUN  8  11  14  14   CREA  0.77  0.85  1.30  1.47*   CA  8.7  8.4*  8.4*  9.0   AGAP  10  12  9  15   BUCR  10*  13  11*  10*   AP   --    --    --   40*   TP   --    --    --   6.4   ALB   --    --    --   4.4   GLOB   --    --    --   2.0   AGRAT   --    --    --   2.2*        Lactic Acid No results found for: LAC  No results for input(s): LAC in the last 72 hours. Liver Enzymes Protein, total   Date Value Ref Range Status   01/14/2017 6.4 6.4 - 8.2 g/dL Final     Albumin   Date Value Ref Range Status   01/14/2017 4.4 3.4 - 5.0 g/dL Final     Globulin   Date Value Ref Range Status   01/14/2017 2.0 2.0 - 4.0 g/dL Final     A-G Ratio   Date Value Ref Range Status   01/14/2017 2.2 (H) 0.8 - 1.7   Final     AST   Date Value Ref Range Status   01/14/2017 287 (H) 15 - 37 U/L Final     Alk. phosphatase   Date Value Ref Range Status   01/14/2017 40 (L) 45 - 117 U/L Final     Recent Labs      01/14/17 2042   TP  6.4   ALB  4.4   GLOB  2.0   AGRAT  2.2*   SGOT  287*   AP  40*        CBC w/Diff Recent Labs      01/17/17   0945  01/15/17   0606  01/14/17 2042   WBC  10.3  10.2  2.2*   RBC  3.70*  3.44*  3.89*   HGB  11.0*  10.1*  11.4*   HCT  33.1*  31.0*  35.2*   PLT  175  177  170   GRANS  85*  94*  83*   LYMPH  10*  4*  17*   EOS  0  0  0        Coagulation No results for input(s): PTP, INR, APTT in the last 72 hours. No lab exists for component: INREXT      Thyroid  No results found for: T4, T3U, TSH, TSHEXT         ABG No results for input(s): PHI, PHI, PCO2I, PO2, PO2I, HCO3, HCO3I, FIO2, FIO2I in the last 72 hours.     No lab exists for component: POC2 Urinalysis Lab Results   Component Value Date/Time    Color YELLOW 01/14/2017 10:25 PM    Appearance CLEAR 01/14/2017 10:25 PM    Specific gravity >1.030 01/14/2017 10:25 PM    pH (UA) 5.0 01/14/2017 10:25 PM    Protein NEGATIVE  01/14/2017 10:25 PM    Glucose >1000 01/14/2017 10:25 PM    Ketone NEGATIVE  01/14/2017 10:25 PM    Bilirubin NEGATIVE  01/14/2017 10:25 PM    Urobilinogen 0.2 01/14/2017 10:25 PM    Nitrites NEGATIVE  01/14/2017 10:25 PM    Leukocyte Esterase NEGATIVE  01/14/2017 10:25 PM        Micro  No results for input(s): SDES, CULT in the last 72 hours. No results for input(s): CULT in the last 72 hours. XR (Most Recent). CXR reviewed by me and compared with previous CXR   Results from Hospital Encounter encounter on 01/14/17   XR CHEST PORT   Narrative History: Chest pain. Short of breath. Comparison: 1/15/2017. Exam performed AP portable at 1207. Findings: Interstitial changes throughout both lungs appear worse. No focal  infiltrate in the right lung but there may be increasing atelectasis or  consolidation left lower lobe behind the heart. No significant effusion on the  right but there may be a left effusion which appears new from prior. No  pneumothorax. Heart size appears normal. Pulmonary vascularity appears mildly  engorged centrally. Impression Impression:      1. The chest appears worse. Interstitial changes throughout the lungs could  represent developing pulmonary edema. There may also be increasing consolidation  or atelectasis left lower lobe with left effusion. CT (Most Recent)   Results from Hospital Encounter encounter on 01/14/17   CTA CHEST W WO CONT   Narrative EXAM: CTA Chest    INDICATION: 29-year-old patient with dyspnea on exertion , evaluation for  pulmonary embolism is requested.     COMPARISON: Chest radiograph 1/16/2017    TECHNIQUE: Axial CT imaging from the thoracic inlet through the diaphragm with  intravenous contrast utilizing CTA study for pulmonary artery evaluation. Coronal and sagittal MIP reformations were generated at a separate workstation. _______________    FINDINGS:    EXAM QUALITY: Overall exam quality is adequate. Pulmonary arterial enhancement  is adequate. The breath hold is satisfactory. PULMONARY ARTERIES: No convincing evidence of pulmonary embolism. Normal main  pulmonary arterial size. MEDIASTINUM: Cardiac size appears within normal limits. No pericardial effusion. Thoracic aorta is normal in course and caliber. LYMPH NODES: No supraclavicular, axillary, or mediastinal adenopathy. AIRWAY: Unremarkable. LUNGS: Multifocal alveolar opacities noted throughout all lobes of the lungs,  with an are most coalescent in the lower lobes bilaterally. Faint lower lung  septal line thickening noted. PLEURA: There are small bilateral pleural effusions. No pneumothorax. UPPER ABDOMEN: Visualized upper abdomen is unremarkable. .    OTHER: No acute or aggressive osseous abnormalities identified. _______________         Impression IMPRESSION:    1. No evidence of pulmonary embolism. 2.  Multifocal alveolar and lower lung interstitial opacities, the appearance of  which likely pertains to underlying acute alveolar edema with or without  pneumonia. Correlation clinically recommended. 3. Small bilateral pleural effusions. EKG No results found for this or any previous visit. ECHO No results found for this or any previous visit. PFT No flowsheet data found.           Trent Church MD  Pulmonary Critical Care & Sleep Medicine

## 2017-01-18 ENCOUNTER — APPOINTMENT (OUTPATIENT)
Dept: GENERAL RADIOLOGY | Age: 47
DRG: 438 | End: 2017-01-18
Attending: INTERNAL MEDICINE
Payer: COMMERCIAL

## 2017-01-18 LAB
GLUCOSE BLD STRIP.AUTO-MCNC: 145 MG/DL (ref 70–110)
GLUCOSE BLD STRIP.AUTO-MCNC: 146 MG/DL (ref 70–110)
GLUCOSE BLD STRIP.AUTO-MCNC: 147 MG/DL (ref 70–110)
GLUCOSE BLD STRIP.AUTO-MCNC: 213 MG/DL (ref 70–110)

## 2017-01-18 PROCEDURE — 82962 GLUCOSE BLOOD TEST: CPT

## 2017-01-18 PROCEDURE — 74011250636 HC RX REV CODE- 250/636: Performed by: HOSPITALIST

## 2017-01-18 PROCEDURE — 74011250636 HC RX REV CODE- 250/636: Performed by: INTERNAL MEDICINE

## 2017-01-18 PROCEDURE — 74011636637 HC RX REV CODE- 636/637: Performed by: HOSPITALIST

## 2017-01-18 PROCEDURE — 65270000029 HC RM PRIVATE

## 2017-01-18 PROCEDURE — 74011250636 HC RX REV CODE- 250/636: Performed by: PHYSICIAN ASSISTANT

## 2017-01-18 PROCEDURE — 74011000250 HC RX REV CODE- 250: Performed by: PHYSICIAN ASSISTANT

## 2017-01-18 PROCEDURE — 71010 XR CHEST PORT: CPT

## 2017-01-18 PROCEDURE — 74011250637 HC RX REV CODE- 250/637: Performed by: HOSPITALIST

## 2017-01-18 RX ADMIN — METRONIDAZOLE 500 MG: 500 INJECTION, SOLUTION INTRAVENOUS at 05:15

## 2017-01-18 RX ADMIN — INSULIN DETEMIR 15 UNITS: 100 INJECTION, SOLUTION SUBCUTANEOUS at 17:59

## 2017-01-18 RX ADMIN — CLOMIPRAMINE HYDROCHLORIDE 25 MG: 25 CAPSULE ORAL at 22:00

## 2017-01-18 RX ADMIN — LEVOFLOXACIN 500 MG: 5 INJECTION, SOLUTION INTRAVENOUS at 12:52

## 2017-01-18 RX ADMIN — HYDROCHLOROTHIAZIDE 12.5 MG: 25 TABLET ORAL at 09:40

## 2017-01-18 RX ADMIN — LISINOPRIL 20 MG: 20 TABLET ORAL at 09:40

## 2017-01-18 RX ADMIN — ENOXAPARIN SODIUM 40 MG: 40 INJECTION SUBCUTANEOUS at 23:41

## 2017-01-18 RX ADMIN — Medication 5 ML: at 06:00

## 2017-01-18 RX ADMIN — FUROSEMIDE 40 MG: 10 INJECTION, SOLUTION INTRAMUSCULAR; INTRAVENOUS at 09:40

## 2017-01-18 RX ADMIN — Medication 5 ML: at 22:00

## 2017-01-18 RX ADMIN — METRONIDAZOLE 500 MG: 500 INJECTION, SOLUTION INTRAVENOUS at 14:18

## 2017-01-18 RX ADMIN — SODIUM CHLORIDE 1500 MG: 900 INJECTION, SOLUTION INTRAVENOUS at 06:15

## 2017-01-18 RX ADMIN — FUROSEMIDE 40 MG: 10 INJECTION, SOLUTION INTRAMUSCULAR; INTRAVENOUS at 21:29

## 2017-01-18 RX ADMIN — METRONIDAZOLE 500 MG: 500 INJECTION, SOLUTION INTRAVENOUS at 21:29

## 2017-01-18 RX ADMIN — INSULIN LISPRO 4 UNITS: 100 INJECTION, SOLUTION INTRAVENOUS; SUBCUTANEOUS at 17:58

## 2017-01-18 NOTE — PROGRESS NOTES
0720 Received bedside shift change report from off going [address] 4134 Schedule medication administered hourly rounds initiated. 1030 Received order for Stat CTA of the chest to rule out PE.  1200 Patient appears calm resting well at present time. Scheduled Flagyl administered as per order with no S/S of adverse reaction. 1230 Scheduled Levofloxacin administered as per order with adverse reaction. 1430 New orders received and  Acknowledged. 1630 Scheduled insulin administered   1800 Scheduled  Antiobiotics Vancomycin  Administered No adverse reaction  Observed. 1920 Bedside shift change report given to  (oncoming nurse)  Shira Hauser by  Juana Delaney nurse). Man Report included the following information SBAR

## 2017-01-18 NOTE — PROGRESS NOTES
1920 Received shift report from Matti Montes RN. Pt sitting in bed with no complaints at this time. Bed in lowest position, call bell at bedside, will continue to monitor. 2100 Pt urinated in specimen cup and brought to lab. Educated pt on sputum culture and left sterile specimen cup with pt. Pt stated he has not been spitting up as much sputum. 2330 Pt lying in bed with no complaints at this time. Bed in lowest position, call bell at bedside, will continue to monitor. 0300 Pt sleeping wearing CPAP  with no complaints. Bed in lowest position, call bell at bedside, will continue to monitor. 0500 Pt sleeping wearing CPAP with no complaints. Bed in lowest position, call bell at bedside, will continue to monitor.

## 2017-01-18 NOTE — ADT AUTH CERT NOTES
Utilization Review           Pancreatitis - Care Day 3 (1/17/2017) by Eriberto Mayberry        Review Status Review Entered       Completed 1/18/2017       Details              Care Day: 3 Care Date: 1/17/2017 Level of Care: Telemetry       Guideline Day 2        Level Of Care       (X) Floor              Clinical Status       ( ) * Hemodynamic stability       1/18/2017 11:00 AM EST by Justin Martines         99.5  °F (37.5  °C) P  108 /94 -- At rest;Sitting R20   % Nasal cannula 4 l/min              (X) * Pain absent or reduced              Activity       (X) Activity as tolerated              Routes       (X) Possible oral hydration and medications       (X) Oral diet as tolerated              Interventions       (X) * NG tube absent       (X) Laboratory tests       1/18/2017 11:00 AM EST by Justin Martines         wbc 10.3, neut 85, lymphs 10, ANC 8.7                     Medications       ( ) Parenteral analgesia       1/18/2017 11:00 AM EST by Justin Martines         MEDS; anafranil po , Lasix 40 iv q 12h, lovenox sc, hctz PO , Insulin sq, Levaquin iv q 24h, VANC iv ,lisinopril po , Flagyl iv q 8h,   , IVFs.                     1/18/2017 11:00 AM EST by Justin Martines       Subject: Additional Clinical Information          CHEST CT-   Multifocal alveolar and lower lung interstitial opacities, the appearance of  which likely pertains to underlying acute alveolar edema with or without  pneumonia. Correlation clinically recommended.      3. Small bilateral pleural effusions. ...... Migue Shillings Per medicine--  Patient sitting in bed. Noted patient to be on 5L O2 via NC. Patient reported feeling short of breath with inspiration and exertion. Overnight events include oxygen desaturation into the 80s requiring 5L O2 via NC. O2 decreased by this provider and patient desaturated to 91& on 2L O2--> increased back to 5L and returned to 96%. .... Migue Shillings Migue Shillings Migue Shillings Abdominal pain significantly decreased since yesterday. .  Hypoxia: requiring 5L O2- CTA stat. Monitor respiratory status . ... Harris Mellow Harris Mellow Harris Mellow Acute pancreatitis: resolved. ? 2/2 Zocor- continue to hold . ....... HTN: continue HCTZ, Lisinopril. Monitor BP control . ...... Harris Mellow DM: stable. Continue SSI and Levemir. Monitor glucose control. .......... Harris Mellow Anxiety/Depression: continue Anafranil . ...... Harris Mellow   DVT prophylaxis: Lovenox   .                                         * Milestone              Additional Notes       Per Pulmonary-PLAN: Clinical picture with bilateral effusion and alveolar infiltrate suggest more of fluid overload       ALI and Multilobar pneumonia can not be excluded due to cough and fever       Sputum Culture       Give one dose vanco       Give lasix bid for 2-4 dose and follow up with serial imaging       Follow on Echo       Troponin and BNP is ok        On levaquin and flagyl for pancreatitis will cover pneumonia also        Bronchodilators       Aspiration precaution       DC iv fluids       UDS       Will follow        Repeat Xray in am       Taper oxygen as tolerated                         Pancreatitis - Care Day 2 (1/16/2017) by Lynn Brock        Review Status Review Entered       Completed 1/18/2017       Details              Care Day: 2 Care Date: 1/16/2017 Level of Care: Telemetry       Guideline Day 2        Level Of Care       (X) Floor              Clinical Status       ( ) * Hemodynamic stability       1/18/2017 10:53 AM EST by Mat Rubin         98.2  °F (36.8  °C) p 110  bp 98/78 -- -- r 24 95 % CPAP mask 3 l/min  Sat 84 % on 2 liters nc.              ( ) * Pain absent or reduced              Activity       (X) Activity as tolerated              Routes       (X) Possible oral hydration and medications       (X) Oral diet as tolerated       1/18/2017 10:53 AM EST by Mat Rubin         diab diet, Amb w/ assist, TELE, I&O                     Interventions       (X) * NG tube absent       (X) Laboratory tests       1/18/2017 10:53 AM EST by Mat Rubin         Hg A 1 c is 6.8.                     Medications       (X) Parenteral analgesia       1/18/2017 10:53 AM EST by Yoan Hardy         [facility] , lovenox sc, hctz PO , Insulin sq, Levaquin iv q 24h, lisinopril po , Flagyl iv q 8h,   morphine iv , IVFs at 200, reduced to 100/h.                     1/18/2017 10:53 AM EST by Yoan Hardy       Subject: Additional Clinical Information              Patient noted to feel SOB with Desats 89-90% on RA. Placed on O2. Remains Tachycardic with HR in 110''s. CXR yesterday with pulmonary edema picture. Fever of 102 yesterday. CT appears to show colitis with ileus.    ... Elian Broach Elianvick Viera Check Echo, BNP, BMP, EKG, repeat CXR  Start Flagyl and Levaquin for possible colitis and fever  Repeat lipase. Continue NPO. Lowered dose of fluids from 200 ml/hr to 100 ml/hr  May need GI consult. ..... Elian Viera CXR-The chest appears worse. Interstitial changes throughout the lungs could  represent developing pulmonary edema.  There may also be increasing consolidation  or atelectasis left lower lobe with left effusion                                       * Milestone

## 2017-01-18 NOTE — PROGRESS NOTES
Chart reviewed. Plan remains home when medically stable. Will cont to follow for any needs. [address] [address] 8525.

## 2017-01-18 NOTE — PROGRESS NOTES
[facility]  Hospitalist Division        Inpatient Daily Progress Note    Daily progress Note    Patient: Mikayla Brandt MRN: [id number]  CSN: [id number]    YOB: 1970  Age: 42 y.o. Sex: male    DOA: 1/14/2017 LOS:  LOS: 3 days                    Chief Complaint:  Abdominal pain       Subjective:      2L O2- feeling better overall. No c/o shortness of breath. Denies abdominal pain. In NAD. Objective:      Visit Vitals    /77 (BP 1 Location: Left arm, BP Patient Position: At rest)    Pulse 99    Temp 98.5 °F (36.9 °C)    Resp 20    Ht 5' 10\" (1.778 m)    Wt 97.5 kg (215 lb)    SpO2 97%    BMI 30.85 kg/m2           Review of Systems:  - fever, - chills, + fatigue, - weight loss, - night sweats   - sore throat, - sinus congestion, - lymphadenopathy, - vision changes  - CP, -  palpitations  + dyspnea on exertion, + dyspnea at rest, + cough, - hemoptysis  - nausea, - vomiting, - diarrhea, + abdominal pain, - reflux, - dysphagia  - dysuria, - hematuria, - urinary frequency  - rash, - pruritis  - back pain, - neck pain, - myalgia, - arthralgia  - H/A, - numbness, - tingling, - weakness, - slurred speech      Physical Exam:  General appearance: alert and oriented, cooperative, no distress, appears stated age  Head: Normocephalic, without obvious abnormality, atraumatic  Lungs: bases diminished bilaterally   Heart: tachycardic rate and rhythm, S1, S2 normal, no murmur, click, rub or gallop  Abdomen: soft, mild tenderness to palpation at umbilicus, non distended. Normoactive bowel sounds. No masses, no organomegaly  Extremities: extremities normal, atraumatic, no cyanosis or edema.  BLE compartments soft, non tender   Skin: Skin color, texture, turgor normal. No rashes or lesions  Neurologic: Grossly normal  PSY: Mood and affect normal, appropriately behaved        Intake and Output:  Current Shift:     Last three shifts:  01/16 1901 - 01/18 0700  In: 1960 [P.O.:540; I.V.:1420]  Out: 1450 [Urine:1450]    Recent Results (from the past 24 hour(s))   GLUCOSE, POC    Collection Time: 01/17/17  5:21 PM   Result Value Ref Range    Glucose (POC) 135 (H) 70 - 110 mg/dL   GLUCOSE, POC    Collection Time: 01/17/17  9:43 PM   Result Value Ref Range    Glucose (POC) 129 (H) 70 - 110 mg/dL   DRUG SCREEN, URINE    Collection Time: 01/17/17  9:45 PM   Result Value Ref Range    BENZODIAZEPINE NEGATIVE  NEG      BARBITURATES NEGATIVE  NEG      THC (TH-CANNABINOL) NEGATIVE  NEG      OPIATES POSITIVE (A) NEG      PCP(PHENCYCLIDINE) NEGATIVE  NEG      COCAINE NEGATIVE  NEG      AMPHETAMINE NEGATIVE  NEG      METHADONE NEGATIVE       HDSCOM (NOTE)    GLUCOSE, POC    Collection Time: 01/18/17  5:16 AM   Result Value Ref Range    Glucose (POC) 147 (H) 70 - 110 mg/dL   GLUCOSE, POC    Collection Time: 01/18/17 12:17 PM   Result Value Ref Range    Glucose (POC) 146 (H) 70 - 110 mg/dL           Lab Results   Component Value Date/Time    Glucose 164 01/17/2017 09:45 AM    Glucose 122 01/16/2017 11:05 AM    Glucose 179 01/15/2017 06:06 AM    Glucose 214 01/14/2017 08:42 PM        Assessment/Plan:     Patient Active Problem List   Diagnosis Code    Erectile dysfunction N52.9    Hypertension I10    Anxiety disorder F41.9    Diabetes ([facility] [address] E11.9    Hypercholesteremia E78.00    Acute pancreatitis K85.90       A/P:  Acute hypoxic respiratory failure: likely 2/2 to volume overload. Appreciate Pulmonology assistance. Continue IV Lasix. Wean O2 as tolerated  Acute pancreatitis: resolved. ? 2/2 Zocor- continue to hold   HTN: continue current regimen of HCTZ, Lisinopril. Monitor BP control   DM: remains stable. Continue SSI and Levemir.  Monitor glucose control  Anxiety/Depression: continue Anafranil   DVT prophylaxis: Lovenox         RADHA Campos-SHITAL D[address]  Pager:  [phone]  Office:  [phone]

## 2017-01-18 NOTE — PROGRESS NOTES
Received report from [facility], patient awake has no complaints of pain or discomfort, IV to left AC infiltrated, will remove IV.     0816: Assessment completed. Removed patient infiltrated IV, patient denies pain and discomfort. 0940: Patient sitting up on side of bed, patient has no complaints of pain or discomfort. 1252: ABT infusing, patient has no complaints. Bedside and Verbal shift change report given to [facility] (oncoming nurse) by Christelle Robles RN (offgoing nurse). Report included the following information SBAR, Kardex, Intake/Output and MAR.

## 2017-01-18 NOTE — PROGRESS NOTES
Bedside and Verbal shift change report given to MELODY Lakhani (oncoming nurse) by Rizwana Ponec RN (offgoing nurse). Report included the following information SBAR, Kardex, Intake/Output, MAR and Recent Results.

## 2017-01-19 ENCOUNTER — APPOINTMENT (OUTPATIENT)
Dept: GENERAL RADIOLOGY | Age: 47
DRG: 438 | End: 2017-01-19
Attending: NURSE PRACTITIONER
Payer: COMMERCIAL

## 2017-01-19 VITALS
RESPIRATION RATE: 16 BRPM | WEIGHT: 215 LBS | SYSTOLIC BLOOD PRESSURE: 127 MMHG | HEIGHT: 70 IN | BODY MASS INDEX: 30.78 KG/M2 | HEART RATE: 93 BPM | OXYGEN SATURATION: 97 % | DIASTOLIC BLOOD PRESSURE: 88 MMHG | TEMPERATURE: 98.1 F

## 2017-01-19 LAB
ANION GAP BLD CALC-SCNC: 13 MMOL/L (ref 3–18)
BUN SERPL-MCNC: 12 MG/DL (ref 7–18)
BUN/CREAT SERPL: 16 (ref 12–20)
CALCIUM SERPL-MCNC: 9.1 MG/DL (ref 8.5–10.1)
CHLORIDE SERPL-SCNC: 98 MMOL/L (ref 100–108)
CO2 SERPL-SCNC: 26 MMOL/L (ref 21–32)
CREAT SERPL-MCNC: 0.74 MG/DL (ref 0.6–1.3)
GLUCOSE BLD STRIP.AUTO-MCNC: 163 MG/DL (ref 70–110)
GLUCOSE SERPL-MCNC: 148 MG/DL (ref 74–99)
MAGNESIUM SERPL-MCNC: 1.4 MG/DL (ref 1.8–2.4)
POTASSIUM SERPL-SCNC: 3.4 MMOL/L (ref 3.5–5.5)
SODIUM SERPL-SCNC: 137 MMOL/L (ref 136–145)

## 2017-01-19 PROCEDURE — 74011250637 HC RX REV CODE- 250/637: Performed by: HOSPITALIST

## 2017-01-19 PROCEDURE — 74011250636 HC RX REV CODE- 250/636: Performed by: INTERNAL MEDICINE

## 2017-01-19 PROCEDURE — 83735 ASSAY OF MAGNESIUM: CPT | Performed by: HOSPITALIST

## 2017-01-19 PROCEDURE — 80048 BASIC METABOLIC PNL TOTAL CA: CPT | Performed by: INTERNAL MEDICINE

## 2017-01-19 PROCEDURE — 74011636637 HC RX REV CODE- 636/637: Performed by: HOSPITALIST

## 2017-01-19 PROCEDURE — 74011000250 HC RX REV CODE- 250: Performed by: PHYSICIAN ASSISTANT

## 2017-01-19 PROCEDURE — 71010 XR CHEST PORT: CPT

## 2017-01-19 PROCEDURE — 82962 GLUCOSE BLOOD TEST: CPT

## 2017-01-19 PROCEDURE — 36415 COLL VENOUS BLD VENIPUNCTURE: CPT | Performed by: INTERNAL MEDICINE

## 2017-01-19 PROCEDURE — 74011250637 HC RX REV CODE- 250/637: Performed by: NURSE PRACTITIONER

## 2017-01-19 RX ORDER — LEVOFLOXACIN 250 MG/1
500 TABLET ORAL EVERY 24 HOURS
Status: DISCONTINUED | OUTPATIENT
Start: 2017-01-19 | End: 2017-01-19 | Stop reason: HOSPADM

## 2017-01-19 RX ORDER — MAGNESIUM SULFATE HEPTAHYDRATE 40 MG/ML
2 INJECTION, SOLUTION INTRAVENOUS
Status: DISCONTINUED | OUTPATIENT
Start: 2017-01-19 | End: 2017-01-19

## 2017-01-19 RX ORDER — POTASSIUM CHLORIDE 20 MEQ/1
40 TABLET, EXTENDED RELEASE ORAL
Status: COMPLETED | OUTPATIENT
Start: 2017-01-19 | End: 2017-01-19

## 2017-01-19 RX ORDER — LANOLIN ALCOHOL/MO/W.PET/CERES
400 CREAM (GRAM) TOPICAL DAILY
Status: DISCONTINUED | OUTPATIENT
Start: 2017-01-19 | End: 2017-01-19 | Stop reason: HOSPADM

## 2017-01-19 RX ORDER — METRONIDAZOLE 250 MG/1
500 TABLET ORAL 3 TIMES DAILY
Status: DISCONTINUED | OUTPATIENT
Start: 2017-01-19 | End: 2017-01-19 | Stop reason: HOSPADM

## 2017-01-19 RX ADMIN — INSULIN LISPRO 3 UNITS: 100 INJECTION, SOLUTION INTRAVENOUS; SUBCUTANEOUS at 12:45

## 2017-01-19 RX ADMIN — POTASSIUM CHLORIDE 40 MEQ: 20 TABLET, EXTENDED RELEASE ORAL at 09:31

## 2017-01-19 RX ADMIN — LEVOFLOXACIN 500 MG: 250 TABLET, FILM COATED ORAL at 11:36

## 2017-01-19 RX ADMIN — LISINOPRIL 20 MG: 20 TABLET ORAL at 09:27

## 2017-01-19 RX ADMIN — METRONIDAZOLE 500 MG: 500 INJECTION, SOLUTION INTRAVENOUS at 05:21

## 2017-01-19 RX ADMIN — HYDROCHLOROTHIAZIDE 12.5 MG: 25 TABLET ORAL at 09:27

## 2017-01-19 RX ADMIN — FUROSEMIDE 40 MG: 10 INJECTION, SOLUTION INTRAMUSCULAR; INTRAVENOUS at 09:27

## 2017-01-19 RX ADMIN — METRONIDAZOLE 500 MG: 250 TABLET ORAL at 11:36

## 2017-01-19 RX ADMIN — Medication 400 MG: at 11:36

## 2017-01-19 RX ADMIN — Medication 10 ML: at 06:00

## 2017-01-19 NOTE — PROGRESS NOTES
Tasha Leon M.D. Pulmonary Critical Care & Sleep Medicine       Pulmonary Follow Up    Name: Celestino Brandt     : 1970     Date: 2017  Doing well  CXR makedly improved  Oxygen requirement improving now on room air  Clinical exam better  Echo reviewed EF normal   IMPRESSION:   Patient Active Problem List   Diagnosis Code    Erectile dysfunction N52.9    Hypertension I10    Anxiety disorder F41.9    Diabetes ([facility] [address] E11.9    Hypercholesteremia E78.00    Acute pancreatitis K85.90 ·   Acute hypoxic respiratory failure  · Multilobar air space ALI vs Fluid overload vs pneumonia   PLAN: Doing well  Doing well   On room air and doing well  CXR showing   IF continue to do well possible dc soon   Clinical picture is more consistent with fluid overload due to large fluid administration and 3rd spacing no need for antibiotics from pulmonary aspect  Troponin and BNP is ok   On levaquin and flagyl for pancreatitis   Bronchodilators  Aspiration precaution     Taper oxygen as tolerated  I spent 35 min of time excluding procedure, with face to face evaluation  >50% on complex decision making, coordination of care and counseling patient. Old records reviewed discussed results and management plan with patient  Images personally reviewed and results and labs reviewed and discussed with patient. All medication reviewed and adjusted  Plan of care discussed with patient. Subjective:  Celestino Brandt is a 42 y.o. male who presents to the ED complaining of abdominal pain. Patient states he ate dinner with 2 glasses of alcohol and went to bed. He was waken out of his sleep by 10/10 bilateral flank pain. Pain was sharp and constant and made it difficult for him to breath.  Treated for pancreatitis with iv fluids and antibiotics and they were planning to discharge him and his oxygen requirement got worse so CT chest done and pulmonary is consulted    As per chart he also had fever of 102 Cough  Not able to expectorate sputum  Occasional wheezing  No fever today  No orthopnea  No h/o Asthma   Ex cigar smoker denies any lung issues  Works in [employer]  No seasonal allergies noted      .   Current Facility-Administered Medications   Medication Dose Route Frequency Provider Last Rate Last Dose    levoFLOXacin (LEVAQUIN) tablet 500 mg  500 mg Oral Q24H Jose Ramos, JAILENE   500 mg at 01/19/17 1136    metroNIDAZOLE (FLAGYL) tablet 500 mg  500 mg Oral TID Maya Lomeli NP   500 mg at 01/19/17 1136    magnesium oxide (MAG-OX) tablet 400 mg  400 mg Oral DAILY Jose Ramos, NP   400 mg at 01/19/17 1136    furosemide (LASIX) injection 40 mg  40 mg IntraVENous Q12H Pacheco Begum MD   40 mg at 01/19/17 1931    clomiPRAMINE (ANAFRANIL) capsule 25 mg (patient's own med)  25 mg Oral QHS Tasneem Flores MD   25 mg at 01/18/17 2200    lisinopril (PRINIVIL, ZESTRIL) tablet 20 mg  20 mg Oral DAILY Tasneem Flores MD   20 mg at 01/19/17 6305    hydroCHLOROthiazide (HYDRODIURIL) tablet 12.5 mg  12.5 mg Oral DAILY Tasneem Flores MD   12.5 mg at 01/19/17 7034    insulin lispro (HUMALOG) injection   SubCUTAneous AC&HS Tasneem Flores MD   3 Units at 01/19/17 1245    glucose chewable tablet 16 g  4 Tab Oral PRN Tasneem Flores MD        glucagon (GLUCAGEN) injection 1 mg  1 mg IntraMUSCular PRN Tasneem Flores MD        dextrose (D50W) injection syrg 12.5-25 g  25-50 mL IntraVENous PRN Tasneem Flores MD        insulin detemir (LEVEMIR) injection 15 Units  15 Units SubCUTAneous DAILY WITH DINNER Tasneem Flores MD   15 Units at 01/18/17 1759    morphine injection 4 mg  4 mg IntraVENous Q4H PRN Annette Simon MD   4 mg at 01/16/17 0950    sodium chloride (NS) flush 5-10 mL  5-10 mL IntraVENous Q8H Thaddeus Ngo MD   10 mL at 01/19/17 0600    sodium chloride (NS) flush 5-10 mL  5-10 mL IntraVENous PRN Annette Simon MD   10 mL at 01/15/17 4933    ondansetron (Edd Alejandro) injection 4 mg  4 mg IntraVENous Q8H PRN Latricia Lopez MD   4 mg at 01/15/17 1341    enoxaparin (LOVENOX) injection 40 mg  40 mg SubCUTAneous Q24H Latricia Lopez MD   40 mg at 01/18/17 2341         Review of Systems:  HEENT: No epistaxis, no nasal drainage, no difficulty in swallowing, no redness in eyes  Respiratory: as above  Cardiovascular: no chest pain, no palpitations, no chronic leg edema, no syncope  Gastrointestinal: no abd pain, no vomiting, no diarrhea, no bleeding symptoms      Objective:     Visit Vitals    /88 (BP 1 Location: Right arm, BP Patient Position: At rest)    Pulse 93    Temp 98.1 °F (36.7 °C)    Resp 16    Ht 5' 10\" (1.778 m)    Wt 97.5 kg (215 lb)    SpO2 97%    BMI 30.85 kg/m2        Physical Exam:   General: comfortable, no acute distress  HEENT: pupils reactive, sclera anicteric, EOM intact  Neck: No adenopathy or thyroid swelling, no lymphadenopathy or JVD, supple  CVS: S1S2 no murmurs  RS: Marked improvement in rales  Abd: soft, non tender, no hepatosplenomegaly  Neuro: non focal, awake, alert  Extrm: no leg edema, clubbing or cyanosis  Skin: no rash    Data review:       Chemistry Recent Labs      01/19/17   0330  01/17/17   0945   GLU  148*  164*   NA  137  138   K  3.4*  4.1   CL  98*  104   CO2  26  24   BUN  12  8   CREA  0.74  0.77   CA  9.1  8.7   MG  1.4*   --    AGAP  13  10   BUCR  16  10*        Lactic Acid No results found for: LAC  No results for input(s): LAC in the last 72 hours. Liver Enzymes Protein, total   Date Value Ref Range Status   01/14/2017 6.4 6.4 - 8.2 g/dL Final     Albumin   Date Value Ref Range Status   01/14/2017 4.4 3.4 - 5.0 g/dL Final     Globulin   Date Value Ref Range Status   01/14/2017 2.0 2.0 - 4.0 g/dL Final     A-G Ratio   Date Value Ref Range Status   01/14/2017 2.2 (H) 0.8 - 1.7   Final     AST   Date Value Ref Range Status   01/14/2017 287 (H) 15 - 37 U/L Final     Alk.  phosphatase   Date Value Ref Range Status 01/14/2017 40 (L) 45 - 117 U/L Final     No results for input(s): TP, ALB, GLOB, AGRAT, SGOT, GPT, AP, TBIL in the last 72 hours. No lab exists for component: DBIL     CBC w/Diff Recent Labs      01/17/17   0945   WBC  10.3   RBC  3.70*   HGB  11.0*   HCT  33.1*   PLT  175   GRANS  85*   LYMPH  10*   EOS  0        Coagulation No results for input(s): PTP, INR, APTT in the last 72 hours. No lab exists for component: INREXT, INREXT      Thyroid  No results found for: T4, T3U, TSH, TSHEXT, TSHEXT         ABG No results for input(s): PHI, PHI, PCO2I, PO2, PO2I, HCO3, HCO3I, FIO2, FIO2I in the last 72 hours. No lab exists for component: POC2     Urinalysis Lab Results   Component Value Date/Time    Color YELLOW 01/14/2017 10:25 PM    Appearance CLEAR 01/14/2017 10:25 PM    Specific gravity >1.030 01/14/2017 10:25 PM    pH (UA) 5.0 01/14/2017 10:25 PM    Protein NEGATIVE  01/14/2017 10:25 PM    Glucose >1000 01/14/2017 10:25 PM    Ketone NEGATIVE  01/14/2017 10:25 PM    Bilirubin NEGATIVE  01/14/2017 10:25 PM    Urobilinogen 0.2 01/14/2017 10:25 PM    Nitrites NEGATIVE  01/14/2017 10:25 PM    Leukocyte Esterase NEGATIVE  01/14/2017 10:25 PM        Micro  Recent Labs      01/17/17   1338  01/17/17   1325   CULT  NO GROWTH 2 DAYS  NO GROWTH 2 DAYS     Recent Labs      01/17/17   1338  01/17/17   1325   CULT  NO GROWTH 2 DAYS  NO GROWTH 2 DAYS        XR (Most Recent). CXR reviewed by me and compared with previous CXR   Results from Hospital Encounter encounter on 01/14/17   XR CHEST PORT   Narrative A portable AP radiograph of the chest was obtained at 0335 hours:  INDICATION:  Hypoxia. COMPARISON:  1/16/2017. FINDINGS:     Heart and mediastinum: Unremarkable. Lungs and pleura: Bilateral airspace disease is decreasing compared to the prior  study. No dense consolidation is seen however. Aorta: Unremarkable. Bones: Within normal limits for age. Other: None.          Impression Impression:    Decreasing bilateral airspace disease most likely due to improving pulmonary  edema. Cannot exclude concomitant pneumonia however. When patient is clinically able, standard PA and lateral views would be more  helpful. CT (Most Recent)   Results from Hospital Encounter encounter on 01/14/17   CTA CHEST W WO CONT   Narrative EXAM: CTA Chest    INDICATION: 30-year-old patient with dyspnea on exertion , evaluation for  pulmonary embolism is requested. COMPARISON: Chest radiograph 1/16/2017    TECHNIQUE: Axial CT imaging from the thoracic inlet through the diaphragm with  intravenous contrast utilizing CTA study for pulmonary artery evaluation. Coronal and sagittal MIP reformations were generated at a separate workstation. _______________    FINDINGS:    EXAM QUALITY: Overall exam quality is adequate. Pulmonary arterial enhancement  is adequate. The breath hold is satisfactory. PULMONARY ARTERIES: No convincing evidence of pulmonary embolism. Normal main  pulmonary arterial size. MEDIASTINUM: Cardiac size appears within normal limits. No pericardial effusion. Thoracic aorta is normal in course and caliber. LYMPH NODES: No supraclavicular, axillary, or mediastinal adenopathy. AIRWAY: Unremarkable. LUNGS: Multifocal alveolar opacities noted throughout all lobes of the lungs,  with an are most coalescent in the lower lobes bilaterally. Faint lower lung  septal line thickening noted. PLEURA: There are small bilateral pleural effusions. No pneumothorax. UPPER ABDOMEN: Visualized upper abdomen is unremarkable. .    OTHER: No acute or aggressive osseous abnormalities identified. _______________         Impression IMPRESSION:    1. No evidence of pulmonary embolism. 2.  Multifocal alveolar and lower lung interstitial opacities, the appearance of  which likely pertains to underlying acute alveolar edema with or without  pneumonia. Correlation clinically recommended.     3. Small bilateral pleural effusions. EKG No results found for this or any previous visit. ECHO No results found for this or any previous visit. PFT No flowsheet data found.              CXR 1/18/17           Davie Martines MD  Pulmonary Critical Care & Sleep Medicine

## 2017-01-19 NOTE — PROGRESS NOTES
1930 Received shift report Lesvia RN. Pt sitting in bed with no complaints. Bed in lowest position, call bell within reach, will continue to monitor. 0000 Pt getting ready for bed, putting on CPAP. Will continue to monitor. 0300 Pt sleeping with no complaints. Bed in lowest position, call bell at bedside, will continue to monitor. 0530 Started pt's Flagyl and IV infiltrated. Joshua Mcknight started a new IV on pt. Pt has no complaints of pain, pt took of CPAP and is now on 2L O2. Bed in lowest position, call bell at bedside, will continue to monitor.

## 2017-01-19 NOTE — PROGRESS NOTES
[address] January 19, 2017       RE: Maryfrances Krabbe Blunt      To Whom It May Concern,    This is to certify that Maryfrances Krabbe Blunt was admitted for inpatient care from 1/15/2017 through 1/19/2017. He may may return to work on Monday, January 23, 2017 with no restrictions. Please feel free to contact my office if you have any questions or concerns. Thank you for your assistance in this matter.       Sincerely,      Ivelisse Nicholson, JAILENE  [phone]

## 2017-01-19 NOTE — DISCHARGE INSTRUCTIONS
Abdominal Pain: Care Instructions  Your Care Instructions    Abdominal pain has many possible causes. Some aren't serious and get better on their own in a few days. Others need more testing and treatment. If your pain continues or gets worse, you need to be rechecked and may need more tests to find out what is wrong. You may need surgery to correct the problem. Don't ignore new symptoms, such as fever, nausea and vomiting, urination problems, pain that gets worse, and dizziness. These may be signs of a more serious problem. Your doctor may have recommended a follow-up visit in the next 8 to 12 hours. If you are not getting better, you may need more tests or treatment. The doctor has checked you carefully, but problems can develop later. If you notice any problems or new symptoms, get medical treatment right away. Follow-up care is a key part of your treatment and safety. Be sure to make and go to all appointments, and call your doctor if you are having problems. It's also a good idea to know your test results and keep a list of the medicines you take. How can you care for yourself at home? · Rest until you feel better. · To prevent dehydration, drink plenty of fluids, enough so that your urine is light yellow or clear like water. Choose water and other caffeine-free clear liquids until you feel better. If you have kidney, heart, or liver disease and have to limit fluids, talk with your doctor before you increase the amount of fluids you drink. · If your stomach is upset, eat mild foods, such as rice, dry toast or crackers, bananas, and applesauce. Try eating several small meals instead of two or three large ones. · Wait until 48 hours after all symptoms have gone away before you have spicy foods, alcohol, and drinks that contain caffeine. · Do not eat foods that are high in fat. · Avoid anti-inflammatory medicines such as aspirin, ibuprofen (Advil, Motrin), and naproxen (Aleve).  These can cause stomach upset. Talk to your doctor if you take daily aspirin for another health problem. When should you call for help? Call 911 anytime you think you may need emergency care. For example, call if:  · You passed out (lost consciousness). · You pass maroon or very bloody stools. · You vomit blood or what looks like coffee grounds. · You have new, severe belly pain. Call your doctor now or seek immediate medical care if:  · Your pain gets worse, especially if it becomes focused in one area of your belly. · You have a new or higher fever. · Your stools are black and look like tar, or they have streaks of blood. · You have unexpected vaginal bleeding. · You have symptoms of a urinary tract infection. These may include:  ¨ Pain when you urinate. ¨ Urinating more often than usual.  ¨ Blood in your urine. · You are dizzy or lightheaded, or you feel like you may faint. Watch closely for changes in your health, and be sure to contact your doctor if:  · You are not getting better after 1 day (24 hours). Where can you learn more? Go to http://lynnetteGenomegeorge.info/. Enter D127 in the search box to learn more about \"Abdominal Pain: Care Instructions. \"  Current as of: May 27, 2016  Content Version: 11.1  © 6449-8608 [employer]. Care instructions adapted under license by [employer] (which disclaims liability or warranty for this information). If you have questions about a medical condition or this instruction, always ask your healthcare professional. [place] [address] any warranty or liability for your use of this information. DISCHARGE SUMMARY from Nurse    The following personal items are in your possession at time of discharge:    Dental Appliances: None  Visual Aid: None     Home Medications: None  Jewelry: Ring, With patient  Clothing:  Footwear, Pants, Shirt, Socks, Undergarments, With patient  Other Valuables: Cell Phone, With patient  Personal Items Sent to Safe: None          PATIENT INSTRUCTIONS:    After general anesthesia or intravenous sedation, for 24 hours or while taking prescription Narcotics:  · Limit your activities  · Do not drive and operate hazardous machinery  · Do not make important personal or business decisions  · Do  not drink alcoholic beverages  · If you have not urinated within 8 hours after discharge, please contact your surgeon on call. Report the following to your surgeon:  · Excessive pain, swelling, redness or odor of or around the surgical area  · Temperature over 100.5  · Nausea and vomiting lasting longer than 4 hours or if unable to take medications  · Any signs of decreased circulation or nerve impairment to extremity: change in color, persistent  numbness, tingling, coldness or increase pain  · Any questions        What to do at Home:  Recommended activity: Activity as tolerated and No driving while on analgesics,     If you experience any of the following symptoms nausea, vomiting, severe abdominal pain, chest pain, short of breath, please follow up with PCP. *  Please give a list of your current medications to your Primary Care Provider. *  Please update this list whenever your medications are discontinued, doses are      changed, or new medications (including over-the-counter products) are added. *  Please carry medication information at all times in case of emergency situations. These are general instructions for a healthy lifestyle:    No smoking/ No tobacco products/ Avoid exposure to second hand smoke    Surgeon General's Warning:  Quitting smoking now greatly reduces serious risk to your health.     Obesity, smoking, and sedentary lifestyle greatly increases your risk for illness    A healthy diet, regular physical exercise & weight monitoring are important for maintaining a healthy lifestyle    You may be retaining fluid if you have a history of heart failure or if you experience any of the following symptoms:  Weight gain of 3 pounds or more overnight or 5 pounds in a week, increased swelling in our hands or feet or shortness of breath while lying flat in bed. Please call your doctor as soon as you notice any of these symptoms; do not wait until your next office visit. Recognize signs and symptoms of STROKE:    F-face looks uneven    A-arms unable to move or move unevenly    S-speech slurred or non-existent    T-time-call 911 as soon as signs and symptoms begin-DO NOT go       Back to bed or wait to see if you get better-TIME IS BRAIN. Warning Signs of HEART ATTACK     Call 911 if you have these symptoms:   Chest discomfort. Most heart attacks involve discomfort in the center of the chest that lasts more than a few minutes, or that goes away and comes back. It can feel like uncomfortable pressure, squeezing, fullness, or pain.  Discomfort in other areas of the upper body. Symptoms can include pain or discomfort in one or both arms, the back, neck, jaw, or stomach.  Shortness of breath with or without chest discomfort.  Other signs may include breaking out in a cold sweat, nausea, or lightheadedness. Don't wait more than five minutes to call 911 - MINUTES MATTER! Fast action can save your life. Calling 911 is almost always the fastest way to get lifesaving treatment. Emergency Medical Services staff can begin treatment when they arrive -- up to an hour sooner than if someone gets to the hospital by car. The discharge information has been reviewed with the patient. The patient verbalized understanding. Discharge medications reviewed with the patient and appropriate educational materials and side effects teaching were provided. Patient armband removed and shredded      MyChart Activation    Thank you for requesting access to [employer]. Please follow the instructions below to securely access and download your online medical record.  [employer] allows you to send messages to your doctor, view your test results, renew your prescriptions, schedule appointments, and more. How Do I Sign Up? 1. In your internet browser, go to [email or web address]  2. Click on the First Time User? Click Here link in the Sign In box. You will be redirect to the New Member Sign Up page. 3. Enter your [employer] Access Code exactly as it appears below. You will not need to use this code after youve completed the sign-up process. If you do not sign up before the expiration date, you must request a new code. [employer] Access Code: [email or web address]  Expires: 2017 10:14 PM (This is the date your [employer] access code will )    4. Enter the last four digits of your Social Security Number (xxxx) and Date of Birth (mm/dd/yyyy) as indicated and click Submit. You will be taken to the next sign-up page. 5. Create a [employer] ID. This will be your [employer] login ID and cannot be changed, so think of one that is secure and easy to remember. 6. Create a [employer] password. You can change your password at any time. 7. Enter your Password Reset Question and Answer. This can be used at a later time if you forget your password. 8. Enter your e-mail address. You will receive e-mail notification when new information is available in [address] 9. Click Sign Up. You can now view and download portions of your medical record. 10. Click the Download Summary menu link to download a portable copy of your medical information. Additional Information    If you have questions, please visit the Frequently Asked Questions section of the [employer] website at [email or web address] Remember, [employer] is NOT to be used for urgent needs. For medical emergencies, dial 911.

## 2017-01-19 NOTE — PROGRESS NOTES
Bedside and Verbal shift change report given to MELODY Yang (oncoming nurse) by Teresa Howard RN (offgoing nurse). Report included the following information SBAR, Kardex, Intake/Output, MAR and Recent Results.

## 2017-01-19 NOTE — PROGRESS NOTES
Care Management Interventions  PCP Verified by CM: Yes  Palliative Care Consult (Criteria: CHF and RRAT>21): No  Reason for No Palliative Care Consult: Other (see comment) (na)  Mode of Transport at Discharge:  Other (see comment) (wife)  Discharge Durable Medical Equipment: No  Physical Therapy Consult: No  Occupational Therapy Consult: No  Speech Therapy Consult: No  Current Support Network: Lives with Spouse  Confirm Follow Up Transport: Family  Plan discussed with Pt/Family/Caregiver: Yes  Discharge Location  Discharge Placement: Home

## 2017-01-19 NOTE — DISCHARGE SUMMARY
[facility]    Discharge Summary    Patient: Randall Brandt MRN: [id number]  CSN: [id number]    YOB: 1970  Age: 42 y.o. Sex: male    DOA: 1/14/2017 LOS:  LOS: 4 days   Discharge Date: 1/19/2017     Admission Diagnoses: Acute pancreatitis    Discharge Diagnoses:    Problem List as of 1/19/2017  Date Reviewed: 12/15/2014          Codes Class Noted - Resolved    Acute pancreatitis ICD-10-CM: K85.90  ICD-9-CM: 907.8  1/15/2017 - Present        Diabetes ([address] ICD-10-CM: E11.9  ICD-9-CM: 250.00  12/15/2014 - Present        Hypercholesteremia ICD-10-CM: E78.00  ICD-9-CM: 272.0  12/15/2014 - Present        Erectile dysfunction ICD-10-CM: N52.9  ICD-9-CM: 607.84  10/21/2014 - Present        Hypertension ICD-10-CM: I10  ICD-9-CM: 401.9  10/21/2014 - Present        Anxiety disorder ICD-10-CM: F41.9  ICD-9-CM: 300.00  10/21/2014 - Present              Discharge Condition: Stable    Discharge To: Home    Consults: Hospitalist and [address] [place] [address] Course: Randall Brandt is a 42 y.o. Male with past medical history of DM, Hypercholesterolemia and HTN who presented to the ED complaining of abdominal pain. Patient stated he ate dinner with 2 glasses of alcohol and went to bed. He was woken from sleep by 10/10 bilateral flank pain. Pain was sharp and constant and made it difficult for him to breath. He denied any previous episodes. He denied a h/o gallstones. While in the ED, findings included; elevated lipase and liver enzymes. Elevated glucose. Patient was admitted for further management. Patient diagnosed with acute pancreatitis, kept NPO and hydrated with IVF. CT abd/pelvis was unremarkable. Zocor was held 2/2 to acute pancreatitis. Lipase trended down and abdominal pain resolved. Patient's hospital course was complicated by acute hypoxia and persistent tachycardia.  CTA Chest was obtained which was negative for pulmonary embolism, however, showed multifocal alveolar and lower lung interstitial opacities, the appearance of which likely pertains to underlying acute alveolar edema with or without pneumonia. Patient did not have leukocytosis and remained afebrile. Pulmonology was consulted. 2D echo obtained. EF was 55 % without regional wall motion abnormalities and no diastolic dysfunction. Patient was diuresed with IV Lasix and pulmonary status improved. Patient was weaned to room air and oxygen saturations remained > 97%. IV antibiotics were discontinued as patient's respiratory status was consistent with pulmonary edema vs PNA. Patient stable for discharge home.  Patient instructed to continue to use IS at home as directed and follow up with PCP on 2/1 at 10:00.          Significant Diagnostic Studies:   Recent Results (from the past 24 hour(s))   GLUCOSE, POC    Collection Time: 01/18/17  5:48 PM   Result Value Ref Range    Glucose (POC) 213 (H) 70 - 110 mg/dL   GLUCOSE, POC    Collection Time: 01/18/17  9:29 PM   Result Value Ref Range    Glucose (POC) 145 (H) 70 - 419 mg/dL   METABOLIC PANEL, BASIC    Collection Time: 01/19/17  3:30 AM   Result Value Ref Range    Sodium 137 136 - 145 mmol/L    Potassium 3.4 (L) 3.5 - 5.5 mmol/L    Chloride 98 (L) 100 - 108 mmol/L    CO2 26 21 - 32 mmol/L    Anion gap 13 3.0 - 18 mmol/L    Glucose 148 (H) 74 - 99 mg/dL    BUN 12 7.0 - 18 MG/DL    Creatinine 0.74 0.6 - 1.3 MG/DL    BUN/Creatinine ratio 16 12 - 20      GFR est AA >60 >60 ml/min/1.73m2    GFR est non-AA >60 >60 ml/min/1.73m2    Calcium 9.1 8.5 - 10.1 MG/DL   MAGNESIUM    Collection Time: 01/19/17  3:30 AM   Result Value Ref Range    Magnesium 1.4 (L) 1.8 - 2.4 mg/dL   GLUCOSE, POC    Collection Time: 01/19/17 12:19 PM   Result Value Ref Range    Glucose (POC) 163 (H) 70 - 110 mg/dL       Discharge Medications:     Discharge Medication List as of 1/19/2017  2:43 PM      CONTINUE these medications which have NOT CHANGED    Details   pioglitazone-metFORMIN (ACTOPLUS MET)  mg per tablet Take 1 tablet by mouth two (2) times daily (with meals). , Historical Med      Hydrochlorothiazide (HYDRODIURIL) 12.5 mg tablet Take 12.5 mg by mouth daily. , Historical Med      lisinopril (PRINIVIL, ZESTRIL) 20 mg tablet Take  by mouth daily. , Historical Med      Cholecalciferol, Vitamin D3, (VITAMIN D3) 1,000 unit cap Take  by mouth., Historical Med      clomipramine (ANAFRANIL) 25 mg capsule Take 25 mg by mouth nightly., Historical Med      lorazepam (ATIVAN) 0.5 mg tablet Take  by mouth., Historical Med         STOP taking these medications       simvastatin (ZOCOR) 10 mg tablet Comments:   Reason for Stopping:               Activity: Activity as tolerated, may return to work without restrictions on 1/23/2017    Diet: Diabetic Diet    Wound Care: None needed    Follow-up: PCP on 2/1/2017 at 10:00    Discharge time: 45 minutes   Amelia Samaniego NP  1/19/2017, 3:24 PM

## 2017-01-19 NOTE — PROGRESS NOTES
Ambulated in hallway, no assistance needed. Not short of breath, sats 96% on room air. Oxygen removed this am ( 0900) with nursing monitor status. @ 1450 Discharge instructions communicated to patient, verbalized understanding. Copy of instructions received, all personal belongings at side. @ [phone] Discharged to home, left in no acute distress.

## 2017-01-23 LAB
BACTERIA SPEC CULT: NORMAL
BACTERIA SPEC CULT: NORMAL
SERVICE CMNT-IMP: NORMAL
SERVICE CMNT-IMP: NORMAL

## 2023-11-01 NOTE — PROGRESS NOTES
Jennifer Randle M.D. Pulmonary Critical Care & Sleep Medicine       Pulmonary Follow Up    Name: Willy Brandt     : 1970     Date: 2017  Doing well  CXR makedly improved  Oxygen requirement improving  Clinical exam better  Echo reviewed EF normal   IMPRESSION:   Patient Active Problem List   Diagnosis Code    Erectile dysfunction N52.9    Hypertension I10    Anxiety disorder F41.9    Diabetes ([facility] [address] E11.9    Hypercholesteremia E78.00    Acute pancreatitis K85.90 ·   Acute hypoxic respiratory failure  · Multilobar air space ALI vs Fluid overload vs pneumonia   PLAN: Doing well  Give another day IV lasix  BMP in am  Taper oxygen to 2 liter now and possible room air in next 24 hours  IF continue to do well possible dc soon   DC vanco as all culture negative so far and clinical picture more of fluid overload    Clinical picture with bilateral effusion and alveolar infiltrate suggest more of fluid overload  ALI and Multilobar pneumonia can not be excluded due to cough and fever  Sputum Culture pending  Troponin and BNP is ok   On levaquin and flagyl for pancreatitis will cover pneumonia also   Bronchodilators  Aspiration precaution  DC iv fluids  Will follow      Taper oxygen as tolerated  I spent 35 min of time excluding procedure, with face to face evaluation  >50% on complex decision making, coordination of care and counseling patient. Old records reviewed discussed results and management plan with patient  Images personally reviewed and results and labs reviewed and discussed with patient. All medication reviewed and adjusted  Plan of care discussed with patient. Subjective:  Willy Brandt is a 42 y.o. male who presents to the ED complaining of abdominal pain. Patient states he ate dinner with 2 glasses of alcohol and went to bed. He was waken out of his sleep by 10/10 bilateral flank pain. Pain was sharp and constant and made it difficult for him to breath. Will send diflucan as she may have developed a yeast infection from the antibiotics. Please have her update us if not better. Treated for pancreatitis with iv fluids and antibiotics and they were planning to discharge him and his oxygen requirement got worse so CT chest done and pulmonary is consulted    As per chart he also had fever of 102   Cough  Not able to expectorate sputum  Occasional wheezing  No fever today  No orthopnea  No h/o Asthma   Ex cigar smoker denies any lung issues  Works in [employer]  No seasonal allergies noted      .   Current Facility-Administered Medications   Medication Dose Route Frequency Provider Last Rate Last Dose    furosemide (LASIX) injection 40 mg  40 mg IntraVENous Q12H Pacheco Grant MD   40 mg at 01/18/17 0940    vancomycin (VANCOCIN) 1,500 mg in 0.9% sodium chloride 500 mL IVPB  1,500 mg IntraVENous Q12H Pacheco Begum  mL/hr at 01/18/17 0615 1,500 mg at 01/18/17 0615    [START ON 1/19/2017] VANCOMYCIN INFORMATION NOTE   Other ONCE Pacheco Begum MD        clomiPRAMINE (ANAFRANIL) capsule 25 mg (patient's own med)  25 mg Oral QHS Tasneem Flores MD   25 mg at 01/17/17 2200    lisinopril (PRINIVIL, ZESTRIL) tablet 20 mg  20 mg Oral DAILY Tasneem Flores MD   20 mg at 01/18/17 0940    hydroCHLOROthiazide (HYDRODIURIL) tablet 12.5 mg  12.5 mg Oral DAILY Tasneem Flores MD   12.5 mg at 01/18/17 0940    insulin lispro (HUMALOG) injection   SubCUTAneous AC&HS Tasneem Flores MD   Stopped at 01/16/17 2200    glucose chewable tablet 16 g  4 Tab Oral PRN Tasneem Flores MD        glucagon (GLUCAGEN) injection 1 mg  1 mg IntraMUSCular PRN Tasneem Flores MD        dextrose (D50W) injection syrg 12.5-25 g  25-50 mL IntraVENous PRN Tasneem Flores MD        insulin detemir (LEVEMIR) injection 15 Units  15 Units SubCUTAneous DAILY WITH DINNER Tasneem Flores MD   15 Units at 01/17/17 1725    metroNIDAZOLE (FLAGYL) IVPB premix 500 mg  500 mg IntraVENous Q8H DERRELL Berrios 100 mL/hr at 01/18/17 0515 500 mg at 01/18/17 0515    levoFLOXacin (LEVAQUIN) 500 mg in D5W IVPB  500 mg IntraVENous Q24H Melany Wiley 100 mL/hr at 01/18/17 1252 500 mg at 01/18/17 1252    morphine injection 4 mg  4 mg IntraVENous Q4H PRN Lisa Barrett MD   4 mg at 01/16/17 0950    sodium chloride (NS) flush 5-10 mL  5-10 mL IntraVENous Q8H Lisa Barrett MD   5 mL at 01/18/17 0600    sodium chloride (NS) flush 5-10 mL  5-10 mL IntraVENous PRN Lisa Barrett MD   10 mL at 01/15/17 2223    ondansetron (ZOFRAN) injection 4 mg  4 mg IntraVENous Q8H PRN Lisa Barrett MD   4 mg at 01/15/17 1341    enoxaparin (LOVENOX) injection 40 mg  40 mg SubCUTAneous Q24H Lisa Barrett MD   40 mg at 01/17/17 2348         Review of Systems:  HEENT: No epistaxis, no nasal drainage, no difficulty in swallowing, no redness in eyes  Respiratory: as above  Cardiovascular: no chest pain, no palpitations, no chronic leg edema, no syncope  Gastrointestinal: no abd pain, no vomiting, no diarrhea, no bleeding symptoms      Objective:     Visit Vitals    /78 (BP 1 Location: Left arm, BP Patient Position: At rest)    Pulse 95    Temp 98.1 °F (36.7 °C)    Resp 20    Ht 5' 10\" (1.778 m)    Wt 97.5 kg (215 lb)    SpO2 94%    BMI 30.85 kg/m2        Physical Exam:   General: comfortable, no acute distress  HEENT: pupils reactive, sclera anicteric, EOM intact  Neck: No adenopathy or thyroid swelling, no lymphadenopathy or JVD, supple  CVS: S1S2 no murmurs  RS: Marked improvement in rales  Abd: soft, non tender, no hepatosplenomegaly  Neuro: non focal, awake, alert  Extrm: no leg edema, clubbing or cyanosis  Skin: no rash    Data review:       Chemistry Recent Labs      01/17/17   0945  01/16/17   1105   GLU  164*  122*   NA  138  140   K  4.1  4.6   CL  104  107   CO2  24  21   BUN  8  11   CREA  0.77  0.85   CA  8.7  8.4*   AGAP  10  12   BUCR  10*  13        Lactic Acid No results found for: LAC  No results for input(s): LAC in the last 72 hours.      Liver Enzymes Protein, total   Date Value Ref Range Status 01/14/2017 6.4 6.4 - 8.2 g/dL Final     Albumin   Date Value Ref Range Status   01/14/2017 4.4 3.4 - 5.0 g/dL Final     Globulin   Date Value Ref Range Status   01/14/2017 2.0 2.0 - 4.0 g/dL Final     A-G Ratio   Date Value Ref Range Status   01/14/2017 2.2 (H) 0.8 - 1.7   Final     AST   Date Value Ref Range Status   01/14/2017 287 (H) 15 - 37 U/L Final     Alk. phosphatase   Date Value Ref Range Status   01/14/2017 40 (L) 45 - 117 U/L Final     No results for input(s): TP, ALB, GLOB, AGRAT, SGOT, GPT, AP, TBIL in the last 72 hours. No lab exists for component: DBIL     CBC w/Diff Recent Labs      01/17/17   0945   WBC  10.3   RBC  3.70*   HGB  11.0*   HCT  33.1*   PLT  175   GRANS  85*   LYMPH  10*   EOS  0        Coagulation No results for input(s): PTP, INR, APTT in the last 72 hours. No lab exists for component: INREXT, INREXT      Thyroid  No results found for: T4, T3U, TSH, TSHEXT, TSHEXT         ABG No results for input(s): PHI, PHI, PCO2I, PO2, PO2I, HCO3, HCO3I, FIO2, FIO2I in the last 72 hours. No lab exists for component: POC2     Urinalysis Lab Results   Component Value Date/Time    Color YELLOW 01/14/2017 10:25 PM    Appearance CLEAR 01/14/2017 10:25 PM    Specific gravity >1.030 01/14/2017 10:25 PM    pH (UA) 5.0 01/14/2017 10:25 PM    Protein NEGATIVE  01/14/2017 10:25 PM    Glucose >1000 01/14/2017 10:25 PM    Ketone NEGATIVE  01/14/2017 10:25 PM    Bilirubin NEGATIVE  01/14/2017 10:25 PM    Urobilinogen 0.2 01/14/2017 10:25 PM    Nitrites NEGATIVE  01/14/2017 10:25 PM    Leukocyte Esterase NEGATIVE  01/14/2017 10:25 PM        Micro  Recent Labs      01/17/17   1338  01/17/17   1325   CULT  NO GROWTH AFTER 17 HOURS  NO GROWTH AFTER 17 HOURS     Recent Labs      01/17/17   1338  01/17/17   1325   CULT  NO GROWTH AFTER 17 HOURS  NO GROWTH AFTER 17 HOURS        XR (Most Recent).  CXR reviewed by me and compared with previous CXR   Results from Hospital Encounter encounter on 01/14/17   XR CHEST PORT   Narrative A portable AP radiograph of the chest was obtained at 0335 hours:  INDICATION:  Hypoxia. COMPARISON:  1/16/2017. FINDINGS:     Heart and mediastinum: Unremarkable. Lungs and pleura: Bilateral airspace disease is decreasing compared to the prior  study. No dense consolidation is seen however. Aorta: Unremarkable. Bones: Within normal limits for age. Other: None. Impression Impression:    Decreasing bilateral airspace disease most likely due to improving pulmonary  edema. Cannot exclude concomitant pneumonia however. When patient is clinically able, standard PA and lateral views would be more  helpful. CT (Most Recent)   Results from Hospital Encounter encounter on 01/14/17   CTA CHEST W WO CONT   Narrative EXAM: CTA Chest    INDICATION: 72-year-old patient with dyspnea on exertion , evaluation for  pulmonary embolism is requested. COMPARISON: Chest radiograph 1/16/2017    TECHNIQUE: Axial CT imaging from the thoracic inlet through the diaphragm with  intravenous contrast utilizing CTA study for pulmonary artery evaluation. Coronal and sagittal MIP reformations were generated at a separate workstation. _______________    FINDINGS:    EXAM QUALITY: Overall exam quality is adequate. Pulmonary arterial enhancement  is adequate. The breath hold is satisfactory. PULMONARY ARTERIES: No convincing evidence of pulmonary embolism. Normal main  pulmonary arterial size. MEDIASTINUM: Cardiac size appears within normal limits. No pericardial effusion. Thoracic aorta is normal in course and caliber. LYMPH NODES: No supraclavicular, axillary, or mediastinal adenopathy. AIRWAY: Unremarkable. LUNGS: Multifocal alveolar opacities noted throughout all lobes of the lungs,  with an are most coalescent in the lower lobes bilaterally. Faint lower lung  septal line thickening noted. PLEURA: There are small bilateral pleural effusions. No pneumothorax.     UPPER ABDOMEN: Visualized upper abdomen is unremarkable. .    OTHER: No acute or aggressive osseous abnormalities identified. _______________         Impression IMPRESSION:    1. No evidence of pulmonary embolism. 2.  Multifocal alveolar and lower lung interstitial opacities, the appearance of  which likely pertains to underlying acute alveolar edema with or without  pneumonia. Correlation clinically recommended. 3. Small bilateral pleural effusions. EKG No results found for this or any previous visit. ECHO No results found for this or any previous visit. PFT No flowsheet data found.              CXR 1/18/17       Caryn Sheppard MD  Pulmonary Critical Care & Sleep Medicine